# Patient Record
Sex: MALE | Race: BLACK OR AFRICAN AMERICAN | NOT HISPANIC OR LATINO | URBAN - METROPOLITAN AREA
[De-identification: names, ages, dates, MRNs, and addresses within clinical notes are randomized per-mention and may not be internally consistent; named-entity substitution may affect disease eponyms.]

---

## 2019-08-01 ENCOUNTER — OUTPATIENT (OUTPATIENT)
Dept: OUTPATIENT SERVICES | Facility: HOSPITAL | Age: 29
LOS: 1 days | End: 2019-08-01
Payer: MEDICAID

## 2019-08-01 PROCEDURE — G9001: CPT

## 2019-08-15 ENCOUNTER — EMERGENCY (EMERGENCY)
Facility: HOSPITAL | Age: 29
LOS: 1 days | Discharge: HOME | End: 2019-08-15
Attending: EMERGENCY MEDICINE | Admitting: EMERGENCY MEDICINE
Payer: MEDICAID

## 2019-08-15 VITALS
HEART RATE: 78 BPM | DIASTOLIC BLOOD PRESSURE: 95 MMHG | OXYGEN SATURATION: 98 % | SYSTOLIC BLOOD PRESSURE: 148 MMHG | RESPIRATION RATE: 18 BRPM | TEMPERATURE: 98 F

## 2019-08-15 DIAGNOSIS — F43.22 ADJUSTMENT DISORDER WITH ANXIETY: ICD-10-CM

## 2019-08-15 PROBLEM — Z00.00 ENCOUNTER FOR PREVENTIVE HEALTH EXAMINATION: Status: ACTIVE | Noted: 2019-08-15

## 2019-08-15 PROCEDURE — 99283 EMERGENCY DEPT VISIT LOW MDM: CPT

## 2019-08-15 PROCEDURE — 90792 PSYCH DIAG EVAL W/MED SRVCS: CPT

## 2019-08-15 NOTE — ED PROVIDER NOTE - PROGRESS NOTE DETAILS
Pt was cleared for discharge by psych and SW. SW discussed the case with LITA and the children are well taken care of and will be given back to the patient. ACS already contacted by LITA.

## 2019-08-15 NOTE — ED ADULT TRIAGE NOTE - CHIEF COMPLAINT QUOTE
Patient brought to ER from home by EMS after [place] states he has been threatening him. There is a case pending and the patient keeps his kids but at this time he is talking continually. Pt used to have depression and was taking Zoloft.

## 2019-08-15 NOTE — ED ADULT NURSE NOTE - OBJECTIVE STATEMENT
Received pt in  pt pleasant calm & cooperative c/o on going issues with landlord, pt denies depression no c/o si/hi/avh presently safety & comfort measures maintained eval on going.

## 2019-08-15 NOTE — ED PROVIDER NOTE - OBJECTIVE STATEMENT
29M with pmh of depression BIBA with agitation for psychiatric evaluation. Per EMS pt has been calling 311 multiple times a day to complain about his living conditions and the fact that landlord has not addressed them. Pt has custody of 2 small children. Pt also has a court investigation. Per EMS pt became very agitated at the scene, appeared paranoid and was difficult to redirect. ACS was called and children are currently in [facility] custody. Pt denies SI/HI, hallucinations, drug/alcohol use, f/c, or any other complaints.

## 2019-08-15 NOTE — ED BEHAVIORAL HEALTH ASSESSMENT NOTE - HPI (INCLUDE ILLNESS QUALITY, SEVERITY, DURATION, TIMING, CONTEXT, MODIFYING FACTORS, ASSOCIATED SIGNS AND SYMPTOMS)
30yo AAM, single, domiciled with two children in his apartment, PPH of depression and previously on zoloft (while incarcerated years ago), no prior inpatient or outpatient treatment, no prior SIB or suicide attempts, not currently in treatment, no significant PMH, history of remote cannabis abuse, legal history of incarceration for assault, attempted robbery and criminal contempt of court, brought in by EMS after patient was agitated on scene when speaking with his landlord and police told him they were taking his children to the precinct while he goes to the ED for an evaluation.     Attempted to reach the children's grandmother and aunt (as did the police) and neither answered their phones.     Per report from EMS patient was on zoloft in the past while he was in penitentiary when he was 16. He currently has custody of his two children who are 3 and 4 years old. He has been calling 311 daily to report things that are going on in the apartment (disarray, rat droppings, etc). Landlord reported patient made threats to him. Per landlord patient also lost the keys to the apartment and broke doors. Police on scene ([address]) took his children to the precinct. Patient has an upcoming court case and was recently approved to move out of his current residence to a new apartment. Police wanted patient to be evaluated as he was upset on scene.     Patient seen and examined. He is tearful and asking where his children are and if they are safe. He reports ongoing issues with things being in disarray in his apartment and his landlord not fixing them. He states he stopped speaking to the landlord (who he reports told him will call his  if he keeps calling him) and instead calls 311 daily to file complaints with the building department. "they told me I can call every day and file a complaint so that I can make a case". He reports he did not break doors in his apartment, but was told by the building department to take them off the hinges as they had nails sticking out from them and they were dangerous for his children, which he did. He also reports rat droppings in his apartment and other concerns. He has a court date to address these issues on 8/19/19. He also reports he has been approved to move to a new place due to his complaints to 311 and his living conditions. Patient reports that he and his landlord have had chronic issues since he has been complaining and especially since he has been calling 311. Patient is also concerned now that he is here that [place] may go into his apartment and take all the papers/records he has for the court case next week. He reports that today the landlord called EMS and when police came he was being questioned by 4 different police officers rapidly. He started to become stressed and asked them to stop and slow down. He then became agitated when they told him they were going to take his children to the precinct as he was concerned for their safety and did not want them to be scared. He has been calm and cooperative during his entire ED stay. Patient reports that he was on zoloft when he was 16 and in penitentiary. He was on it for a few months and was not continued when he left penitentiary. He denies SI/HI/I/P. Patient denies any depressive symptoms including depressed mood, anhedonia, changes in energy/concentration/appetite, sleep disturbances, or feelings of guilt. Patient denies manic symptoms including elevated mood, increased irritability, mood lability, distractibility, grandiosity, pressured speech, increase in goal-directed activity, or decreased need for sleep. Patient denies any psychotic symptoms including paranoia, ideas of reference, thought insertion/broadcasting, or auditory/visual/olfactory/tactile/gustatory hallucinations. Patient reports he used cannabis in the remote past and denies any recent use. 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    Per police at [employer] (contacted by social work), his two children are in good health and appear well taken care of. They are amenable to discharge children back to him since he has been psychiatrically cleared. See  note for full collateral.    PSYCKES showed no results for patient for the past 5 years for anything psychiatric.

## 2019-08-15 NOTE — ED BEHAVIORAL HEALTH ASSESSMENT NOTE - LEGAL HISTORY
2006 - incarcerated for assault and attempted robbery, 2012 incarcerated for misdemeanor for criminal contempt of court

## 2019-08-15 NOTE — ED BEHAVIORAL HEALTH ASSESSMENT NOTE - SAFETY PLAN DETAILS
patient advised to return to ED or call 911 for any worsening symptoms and patient agreed. Call [phone] if you need to speak with someone 24/7

## 2019-08-15 NOTE — ED BEHAVIORAL HEALTH ASSESSMENT NOTE - SUMMARY
30yo AAM, single, domiciled with two children in his apartment, PPH of depression and previously on zoloft (while incarcerated years ago), no prior inpatient or outpatient treatment, no prior SIB or suicide attempts, not currently in treatment, no significant PMH, history of remote cannabis abuse, legal history of incarceration for assault, attempted robbery and criminal contempt of court, brought in by EMS after patient was agitated on scene when speaking with his landlord and police told him they were taking his children to the precinct while he goes to the ED for an evaluation.     Patient denies SI/HI/I/P as well as ruthie and psychosis. Reports he has a long standing issue with his landlord due to his apartment in disarray and landlord allegedly not fixing things, to the point where he was recently approved to move to a new place. He also has a court case next week to address these complaints. Patient admits to being upset on scene as he was told his children were being taken to the police station and he did not understand why, stating any concerned parent would also be upset in the same situation. Patient has been calm and cooperative in the ED. He does not appear to be an imminent risk to self or others at this time and does not meet criteria for inpatient admission. Per police reports to , his children are clean and in good health. Patient will be discharged at this time and given information to crisis clinic if he feels the need to go in the future for any reason.

## 2019-08-15 NOTE — ED BEHAVIORAL HEALTH ASSESSMENT NOTE - RISK ASSESSMENT
low. risks include remote history of depression, history of incarceration. Protective factors include no suicide attempts, no recent violence history, no access to guns, no global insomnia, no active substance abuse, caretaker for children who he reports are protective factors, willingness to seek help, no suicidal ideation or homicidal ideation, hopefulness for future.

## 2019-08-15 NOTE — ED BEHAVIORAL HEALTH ASSESSMENT NOTE - DETAILS
police aware called by [employer] today currently at the [employer] [place] (verified by social work that children are there) prior history of arrest for assault when he was 16

## 2019-08-15 NOTE — ED BEHAVIORAL HEALTH NOTE - BEHAVIORAL HEALTH NOTE
writer provided patient metrocard #[id number] writer provided patient metrocard #[id number]

## 2019-08-15 NOTE — ED PROVIDER NOTE - CLINICAL SUMMARY MEDICAL DECISION MAKING FREE TEXT BOX
29M with pmh of depression p/w  agitation. Likely 2/2 to stress, possibly with underlying psychiatric disturbance, less likely substance abuse. Also with ACS case now open. Plan for  eval and  eval. Do not suspect infection, electrolyte abnormality.

## 2019-08-15 NOTE — ED PROVIDER NOTE - NSFOLLOWUPINSTRUCTIONS_ED_ALL_ED_FT
Return to the ED if you feel like you want to harm yourself or others, hearing voices, having fever, chest pain, severe headache or any other concerning symptom.

## 2019-08-15 NOTE — ED BEHAVIORAL HEALTH ASSESSMENT NOTE - OTHER PAST PSYCHIATRIC HISTORY (INCLUDE DETAILS REGARDING ONSET, COURSE OF ILLNESS, INPATIENT/OUTPATIENT TREATMENT)
PPH of depression and previously on zoloft (while incarcerated years ago), no prior inpatient or outpatient treatment, no prior SIB or suicide attempts, not currently in treatment

## 2019-08-15 NOTE — ED BEHAVIORAL HEALTH ASSESSMENT NOTE - SUICIDE PROTECTIVE FACTORS
Responsibility to family and others/Identifies reasons for living/Future oriented/Fear of death or dying due to pain/suffering/High spirituality

## 2019-08-15 NOTE — ED ADULT NURSE REASSESSMENT NOTE - NS ED NURSE REASSESS COMMENT FT1
Evaluated and cleared by psychiatry, MC by MD.  Pt remains awake, calm at baseline mental status. Denies s/i h/avh  d/c instructions given and metro card provided for d/c to home by JACE. English

## 2019-08-15 NOTE — ED BEHAVIORAL HEALTH ASSESSMENT NOTE - DESCRIPTION
calm and cooperative. anxious and tearful, concerned about where his children are and if they are alright.  ICU Vital Signs Last 24 Hrs  T(C): 36.7 (15 Aug 2019 09:54), Max: 36.7 (15 Aug 2019 09:54)  T(F): 98 (15 Aug 2019 09:54), Max: 98 (15 Aug 2019 09:54)  HR: 78 (15 Aug 2019 09:54) (78 - 78)  BP: 148/95 (15 Aug 2019 09:54) (148/95 - 148/95)  BP(mean): --  ABP: --  ABP(mean): --  RR: 18 (15 Aug 2019 09:54) (18 - 18)  SpO2: 98% (15 Aug 2019 09:54) (98% - 98%) denied patient was adopted as a child. lives with two children

## 2019-08-15 NOTE — ED BEHAVIORAL HEALTH ASSESSMENT NOTE - OTHER
tearful and concerned for his children domiciled with 3 and 4 year old children issues with [place] (chronic)

## 2019-08-15 NOTE — ED BEHAVIORAL HEALTH NOTE - BEHAVIORAL HEALTH NOTE
Writer called  to obtain collateral.  Writer left a voicemail requesting a callback to [employer]. Writer called  to obtain collateral.  Writer left a voicemail requesting a callback to social work [employer]. Writer called [employer]  spoke to officer kimberley states pt's children are at the precinct, their grandmother has been called and ACS was called.  Writer informed them patient was psychiatrically cleared and being discharged.  Writer asked if patient can come to get his children officer Zachary states patient cannot get his children because he's an EDP.  Writer spoke with officer that brought patient to the ER Augie who states he will check with his boss if patient can tranport his own children home.  writer informed officer Augie patient was cleared by a psychiatrist. He states he will call back to [employer]. He states they are unable to get in contact with pt's mother or aunt to  the children.

## 2019-08-15 NOTE — ED PROVIDER NOTE - NSFOLLOWUPCLINICS_GEN_ALL_ED_FT
[facility]  Behavioral Health  [id number] [place] [address]  Phone: [phone]  Fax:   Follow Up Time: 4-6 Days

## 2019-08-16 DIAGNOSIS — Z71.89 OTHER SPECIFIED COUNSELING: ICD-10-CM

## 2019-09-24 ENCOUNTER — OUTPATIENT (OUTPATIENT)
Dept: OUTPATIENT SERVICES | Facility: HOSPITAL | Age: 29
LOS: 1 days | Discharge: STILL ADMITTED OR ADMINISTRATIVE | End: 2019-09-24

## 2019-09-25 PROBLEM — F32.9 MAJOR DEPRESSIVE DISORDER, SINGLE EPISODE, UNSPECIFIED: Chronic | Status: ACTIVE | Noted: 2019-08-15

## 2019-10-10 DIAGNOSIS — F43.20 ADJUSTMENT DISORDER, UNSPECIFIED: ICD-10-CM

## 2021-05-23 ENCOUNTER — INPATIENT (INPATIENT)
Facility: HOSPITAL | Age: 31
LOS: 39 days | Discharge: HOME | End: 2021-07-02
Attending: PSYCHIATRY & NEUROLOGY | Admitting: PSYCHIATRY & NEUROLOGY
Payer: MEDICAID

## 2021-05-23 VITALS
HEART RATE: 99 BPM | RESPIRATION RATE: 20 BRPM | OXYGEN SATURATION: 100 % | DIASTOLIC BLOOD PRESSURE: 109 MMHG | TEMPERATURE: 99 F | SYSTOLIC BLOOD PRESSURE: 177 MMHG

## 2021-05-23 RX ORDER — HALOPERIDOL DECANOATE 100 MG/ML
5 INJECTION INTRAMUSCULAR ONCE
Refills: 0 | Status: COMPLETED | OUTPATIENT
Start: 2021-05-23 | End: 2021-05-23

## 2021-05-23 NOTE — ED PROVIDER NOTE - PROGRESS NOTE DETAILS
Pascual Marks DO: received from dr ng. pending inpt psych bed. no acute events during my shift. patient signed out to dr foster. Pascual Marks DO: received from JOYCE Gray, pending inpt bed. pt s/o to Dr Palacios at the end of my shift rec s/o from dr. fernandez-patient boarding for psych bed previously agitated now calm, d/w psych tab to l6. patient becoming very agitated while waiting for a bed, verbal deescalation techniques unsuccessful, [facility] 5haldol, 2ativan, 50benadryl  for agitation.

## 2021-05-23 NOTE — ED PROVIDER NOTE - OBJECTIVE STATEMENT
29 y/o M BIBA  secondary to trashing his room , yelling, screaming and   jumping  as per his landlord.     Appears paranoid, expressing a flight of ideas. No responding to directions.  No evidence of physical injuries, broken  skin or deformities.

## 2021-05-23 NOTE — ED ADULT NURSE NOTE - CHIEF COMPLAINT QUOTE
Pt arrives from home for erratic behavior. Pt's neighbor called 911 d/t pt making a lot of noise. Pt was destroying stuff at home, states he did it "because people are watching him". Pt rambling in triage, yelling his social security number out. Denies ETOH or drug use. To be seen in  as per NP. PMHx Depression

## 2021-05-23 NOTE — ED ADULT NURSE NOTE - OBJECTIVE STATEMENT
Pt presents to  area under arrest with PD [employer] [employer], coming in because pt was destroying home and landlord called PD. Pt behavior is erratic, pt is hyperverbal with nonsensical rambling. Pt states to PD, "you guys just wanna mess with me, you saw my penis and wouldn't wash my feet." Pt denies any specific triggers causing him to destroy home. Denies any AH/VH, denies any HI/SI. Pt medicated as per EMR. Denies any medical complaints, resp even and unlabored and appears in no obv distress.

## 2021-05-24 DIAGNOSIS — F29 UNSPECIFIED PSYCHOSIS NOT DUE TO A SUBSTANCE OR KNOWN PHYSIOLOGICAL CONDITION: ICD-10-CM

## 2021-05-24 DIAGNOSIS — F33.9 MAJOR DEPRESSIVE DISORDER, RECURRENT, UNSPECIFIED: ICD-10-CM

## 2021-05-24 LAB
ALBUMIN SERPL ELPH-MCNC: 4.7 G/DL — SIGNIFICANT CHANGE UP (ref 3.3–5)
ALP SERPL-CCNC: 57 U/L — SIGNIFICANT CHANGE UP (ref 40–120)
ALT FLD-CCNC: 25 U/L — SIGNIFICANT CHANGE UP (ref 4–41)
ANION GAP SERPL CALC-SCNC: 14 MMOL/L — SIGNIFICANT CHANGE UP (ref 7–14)
AST SERPL-CCNC: 45 U/L — HIGH (ref 4–40)
BASOPHILS # BLD AUTO: 0.03 K/UL — SIGNIFICANT CHANGE UP (ref 0–0.2)
BASOPHILS NFR BLD AUTO: 0.4 % — SIGNIFICANT CHANGE UP (ref 0–2)
BILIRUB SERPL-MCNC: 1 MG/DL — SIGNIFICANT CHANGE UP (ref 0.2–1.2)
BUN SERPL-MCNC: 14 MG/DL — SIGNIFICANT CHANGE UP (ref 7–23)
CALCIUM SERPL-MCNC: 9.8 MG/DL — SIGNIFICANT CHANGE UP (ref 8.4–10.5)
CHLORIDE SERPL-SCNC: 103 MMOL/L — SIGNIFICANT CHANGE UP (ref 98–107)
CO2 SERPL-SCNC: 22 MMOL/L — SIGNIFICANT CHANGE UP (ref 22–31)
COVID-19 SPIKE DOMAIN AB INTERP: NEGATIVE — SIGNIFICANT CHANGE UP
COVID-19 SPIKE DOMAIN ANTIBODY RESULT: 0.4 U/ML — SIGNIFICANT CHANGE UP
CREAT SERPL-MCNC: 1.04 MG/DL — SIGNIFICANT CHANGE UP (ref 0.5–1.3)
EOSINOPHIL # BLD AUTO: 0.06 K/UL — SIGNIFICANT CHANGE UP (ref 0–0.5)
EOSINOPHIL NFR BLD AUTO: 0.9 % — SIGNIFICANT CHANGE UP (ref 0–6)
GLUCOSE SERPL-MCNC: 107 MG/DL — HIGH (ref 70–99)
HCT VFR BLD CALC: 41.2 % — SIGNIFICANT CHANGE UP (ref 39–50)
HGB BLD-MCNC: 14 G/DL — SIGNIFICANT CHANGE UP (ref 13–17)
IANC: 4.86 K/UL — SIGNIFICANT CHANGE UP (ref 1.5–8.5)
IMM GRANULOCYTES NFR BLD AUTO: 0.3 % — SIGNIFICANT CHANGE UP (ref 0–1.5)
LYMPHOCYTES # BLD AUTO: 1.4 K/UL — SIGNIFICANT CHANGE UP (ref 1–3.3)
LYMPHOCYTES # BLD AUTO: 20.9 % — SIGNIFICANT CHANGE UP (ref 13–44)
MCHC RBC-ENTMCNC: 30 PG — SIGNIFICANT CHANGE UP (ref 27–34)
MCHC RBC-ENTMCNC: 34 GM/DL — SIGNIFICANT CHANGE UP (ref 32–36)
MCV RBC AUTO: 88.4 FL — SIGNIFICANT CHANGE UP (ref 80–100)
MONOCYTES # BLD AUTO: 0.33 K/UL — SIGNIFICANT CHANGE UP (ref 0–0.9)
MONOCYTES NFR BLD AUTO: 4.9 % — SIGNIFICANT CHANGE UP (ref 2–14)
NEUTROPHILS # BLD AUTO: 4.86 K/UL — SIGNIFICANT CHANGE UP (ref 1.8–7.4)
NEUTROPHILS NFR BLD AUTO: 72.6 % — SIGNIFICANT CHANGE UP (ref 43–77)
NRBC # BLD: 0 /100 WBCS — SIGNIFICANT CHANGE UP
NRBC # FLD: 0 K/UL — SIGNIFICANT CHANGE UP
PLATELET # BLD AUTO: 182 K/UL — SIGNIFICANT CHANGE UP (ref 150–400)
POTASSIUM SERPL-MCNC: 3.4 MMOL/L — LOW (ref 3.5–5.3)
POTASSIUM SERPL-SCNC: 3.4 MMOL/L — LOW (ref 3.5–5.3)
PROT SERPL-MCNC: 7.6 G/DL — SIGNIFICANT CHANGE UP (ref 6–8.3)
RBC # BLD: 4.66 M/UL — SIGNIFICANT CHANGE UP (ref 4.2–5.8)
RBC # FLD: 13.3 % — SIGNIFICANT CHANGE UP (ref 10.3–14.5)
SARS-COV-2 IGG+IGM SERPL QL IA: 0.4 U/ML — SIGNIFICANT CHANGE UP
SARS-COV-2 IGG+IGM SERPL QL IA: NEGATIVE — SIGNIFICANT CHANGE UP
SARS-COV-2 RNA SPEC QL NAA+PROBE: SIGNIFICANT CHANGE UP
SODIUM SERPL-SCNC: 139 MMOL/L — SIGNIFICANT CHANGE UP (ref 135–145)
TOXICOLOGY SCREEN, DRUGS OF ABUSE, SERUM RESULT: SIGNIFICANT CHANGE UP
TSH SERPL-MCNC: 1.75 UIU/ML — SIGNIFICANT CHANGE UP (ref 0.27–4.2)
WBC # BLD: 6.7 K/UL — SIGNIFICANT CHANGE UP (ref 3.8–10.5)
WBC # FLD AUTO: 6.7 K/UL — SIGNIFICANT CHANGE UP (ref 3.8–10.5)

## 2021-05-24 PROCEDURE — 99285 EMERGENCY DEPT VISIT HI MDM: CPT

## 2021-05-24 RX ORDER — DIPHENHYDRAMINE HCL 50 MG
50 CAPSULE ORAL ONCE
Refills: 0 | Status: COMPLETED | OUTPATIENT
Start: 2021-05-24 | End: 2021-05-24

## 2021-05-24 RX ORDER — HALOPERIDOL DECANOATE 100 MG/ML
5 INJECTION INTRAMUSCULAR EVERY 6 HOURS
Refills: 0 | Status: DISCONTINUED | OUTPATIENT
Start: 2021-05-24 | End: 2021-07-02

## 2021-05-24 RX ORDER — DIPHENHYDRAMINE HCL 50 MG
50 CAPSULE ORAL ONCE
Refills: 0 | Status: DISCONTINUED | OUTPATIENT
Start: 2021-05-24 | End: 2021-07-02

## 2021-05-24 RX ORDER — DIPHENHYDRAMINE HCL 50 MG
50 CAPSULE ORAL EVERY 6 HOURS
Refills: 0 | Status: DISCONTINUED | OUTPATIENT
Start: 2021-05-24 | End: 2021-07-02

## 2021-05-24 RX ORDER — HALOPERIDOL DECANOATE 100 MG/ML
5 INJECTION INTRAMUSCULAR ONCE
Refills: 0 | Status: DISCONTINUED | OUTPATIENT
Start: 2021-05-24 | End: 2021-07-02

## 2021-05-24 RX ORDER — HALOPERIDOL DECANOATE 100 MG/ML
5 INJECTION INTRAMUSCULAR ONCE
Refills: 0 | Status: COMPLETED | OUTPATIENT
Start: 2021-05-24 | End: 2021-05-24

## 2021-05-24 RX ADMIN — HALOPERIDOL DECANOATE 5 MILLIGRAM(S): 100 INJECTION INTRAMUSCULAR at 00:00

## 2021-05-24 RX ADMIN — Medication 2 MILLIGRAM(S): at 00:00

## 2021-05-24 RX ADMIN — HALOPERIDOL DECANOATE 5 MILLIGRAM(S): 100 INJECTION INTRAMUSCULAR at 12:27

## 2021-05-24 RX ADMIN — Medication 2 MILLIGRAM(S): at 12:28

## 2021-05-24 RX ADMIN — Medication 50 MILLIGRAM(S): at 12:27

## 2021-05-24 NOTE — ED ADULT NURSE REASSESSMENT NOTE - NS ED NURSE REASSESS COMMENT FT1
Pt in room 1BH.  Labs drawn and sent.  Covid swabbed.  EKG performed by PCA.  Meal provided.  Psych speaking with pt.  Pt calm and cooperative.  Primary RN reports pt medicated prior to shift hand off.

## 2021-05-24 NOTE — ED BEHAVIORAL HEALTH ASSESSMENT NOTE - OTHER PAST PSYCHIATRIC HISTORY (INCLUDE DETAILS REGARDING ONSET, COURSE OF ILLNESS, INPATIENT/OUTPATIENT TREATMENT)
Per medical record review (2019 ED assessment) PPH of depression and previously on zoloft (while incarcerated years ago), no prior inpatient or outpatient treatment, no prior SIB or suicide attempts, not currently in treatment

## 2021-05-24 NOTE — ED BEHAVIORAL HEALTH ASSESSMENT NOTE - CASE SUMMARY
Patient was seen , evaluated , discussed elements of hpi/ ros/mse with DR. Murphy and agree with the above assessment and plan. 30 yo single, domiciled, unemployed male with reported PPH of depression previously on Zoloft (while incarcerated years ago), no prior inpatient or outpatient treatment, no prior SIB or suicide attempts, not currently in treatment, no significant PMH, history of remote cannabis abuse, legal history of incarceration for assault, attempted robbery and criminal contempt of court, brought in by EMS for agitation in the home.  On assessment pt is presenting guarded , evasive , with impaired reasoning ,internally preoccupied , not able to engage in meaningful assessment  , as he provides minimal information. Safe discharge at this time is ot possible and pt requires involuntary admission once bed is available.

## 2021-05-24 NOTE — ED BEHAVIORAL HEALTH ASSESSMENT NOTE - HPI (INCLUDE ILLNESS QUALITY, SEVERITY, DURATION, TIMING, CONTEXT, MODIFYING FACTORS, ASSOCIATED SIGNS AND SYMPTOMS)
29 yo single, domiciled, unemployed male with reported PPH of depression previously on Zoloft (while incarcerated years ago), no prior inpatient or outpatient treatment, no prior SIB or suicide attempts, not currently in treatment, no significant PMH, history of remote cannabis abuse, legal history of incarceration for assault, attempted robbery and criminal contempt of court, brought in by EMS for agitation in the home.    Prior to assessment patient received Haldol 5 mg and Ativan 2 mg IM. On assessment, patient is guarded with pressured speech. He states he is having a "landlord dispute". He responds "N/A" or "that's not your business" when asked for details about why or if there's been acute changes in the home. He states this has been a years-long dispute. He also states being bothered by the tenants and other people in his community. He states asking people in the community if they live there and who their family is. He murmurs to himself "I'll slit their throats". He states he has 2 children (~ ages 4, 5 per medical record review) but that they do not live in the home with him. He replies "N/A" when asked if there are safety concerns for them. When asked if he feels the need to protect himself he asks the writer "you want to do push-ups?" stating that the thought appeared in writer's head. He denies AH and VH.      Patient reports getting 9-9.5 hrs of sleep and that he is eating fine. He denies having special zimmerman, need to take on multiple projects (responds he needs his apartment fixed). He denies SI/HI/NSSIB (states "N/A"). Denies depressive symptoms (denies reported history on 2019 ED assessment). When asked about firearms or weapons he states "N/A". He states "N/A" when asked about the need to protect himself. He endorses past legal history and responds "yes" about pending court cases but does not provide further information. He denies substance use. 31 yo single, domiciled, unemployed male with reported PPH of depression previously on Zoloft (while incarcerated years ago), no prior inpatient or outpatient treatment, no prior SIB or suicide attempts, not currently in treatment, no significant PMH, history of remote cannabis abuse, legal history of incarceration for assault, attempted robbery and criminal contempt of court, brought in by EMS for agitation in the home.    Prior to assessment patient received Haldol 5 mg and Ativan 2 mg IM. On assessment, patient is guarded with pressured speech. He states he is having a "landlord dispute". He responds "N/A" or "that's not your business" when asked for details about why or if there's been acute changes in the home. He states this has been a years-long dispute. He also states being bothered by the tenants and other people in his community. He states asking people in the community if they live there and who their family is. He murmurs to himself "I'll slit their throats". He states he has 2 children (~ ages 4, 5 per medical record review) but that they do not live in the home with him. He replies "N/A" when asked if there are safety concerns for them. He asks "you want to do push-ups?" stating that the thought appeared in writer's head. He denies AH and VH.      Patient reports getting 9-9.5 hrs of sleep and that he is eating fine. He denies having special zimmerman, need to take on multiple projects (responds he needs his apartment fixed). He denies SI/HI/NSSIB (states "N/A"). Denies depressive symptoms (denies reported history on 2019 ED assessment). When asked about firearms or weapons he states "N/A". He states "N/A" when asked about the need to protect himself. He endorses past legal history and responds "yes" about pending court cases but does not provide further information. He denies substance use. 30 yo single, domiciled, unemployed male with reported PPH of depression previously on Zoloft (while incarcerated years ago), no prior inpatient or outpatient treatment, no prior SIB or suicide attempts, not currently in treatment, no significant PMH, history of remote cannabis abuse, legal history of incarceration for assault, attempted robbery and criminal contempt of court, brought in by EMS for agitation in the home.    Prior to assessment patient received Haldol 5 mg and Ativan 2 mg IM. On assessment, patient is guarded with pressured speech. He states he is having a "landlord dispute". He responds "N/A" or "that's not your business" when asked for details about why or if there's been acute changes in the home. He states this has been a years-long dispute. He also states being bothered by the tenants and other people in his community. He states asking people in the community if they live there and who their family is. He murmurs to himself "I'll slit their throats". He states he has 2 children (~ ages 5, 6 per medical record review) but that they do not live in the home with him. He replies "N/A" when asked if there are safety concerns for them. He asks "you want to do push-ups?" stating that the thought appeared in writer's head. He denies AH and VH.      Patient reports getting 9-9.5 hrs of sleep and that he is eating fine. He denies having special zimmerman, need to take on multiple projects (responds he needs his apartment fixed). He denies SI/HI/NSSIB (states "N/A"). Denies depressive symptoms (denies reported history on 2019 ED assessment). When asked about firearms or weapons he states "N/A". He states "N/A" when asked about the need to protect himself. He endorses past legal history and responds "yes" about pending court cases but does not provide further information. He denies substance use.

## 2021-05-24 NOTE — ED BEHAVIORAL HEALTH ASSESSMENT NOTE - SUMMARY
31 yo single, domiciled, unemployed male with reported PPH of depression previously on Zoloft (while incarcerated years ago), no prior inpatient or outpatient treatment, no prior SIB or suicide attempts, not currently in treatment, no significant PMH, history of remote cannabis abuse, legal history of incarceration for assault, attempted robbery and criminal contempt of court, brought in by EMS for agitation in the home.    Patient at this time is displaying mixed symptoms of psychosis and ruthie: He appears internally preoccupied, describes being able to hear writer's thoughts, and displays overt paranoia with some referential thinking. His speech is rapid and difficult to interrupt. He displays impaired reasoning. He replies "N/A" to questions around SI/HI but with threatening speech associated with paranoid beliefs. Per medical record review (2019 ED assessment), patient with hx of depression and confirmed dispute with [place]. It is unclear what the progression of these disputes have been or if there has been any psychiatric/social follow-up. Involuntary hospitalization is warranted for acute stabilization of presenting symptoms. 31 yo single, domiciled, unemployed male with reported PPH of depression previously on Zoloft (while incarcerated years ago), no prior inpatient or outpatient treatment, no prior SIB or suicide attempts, not currently in treatment, no significant PMH, history of remote cannabis abuse, legal history of incarceration for assault, attempted robbery and criminal contempt of court, brought in by EMS for agitation in the home.    Patient at this time is displaying mixed symptoms of psychosis and ruthie: He appears internally preoccupied, describes being able to hear writer's thoughts, and displays overt paranoia with some referential thinking. His speech is rapid and difficult to interrupt. He displays impaired reasoning. He replies "N/A" to questions around SI/HI but with threatening speech associated with paranoid beliefs. Per medical record review (2019 ED assessment), patient with hx of depression and confirmed dispute with [place]. It is unclear what the progression of these disputes have been or if there has been any psychiatric/social follow-up. Overall, involuntary hospitalization is warranted for acute stabilization of presenting symptoms. 32 yo single, domiciled, unemployed male with reported PPH of depression previously on Zoloft (while incarcerated years ago), no prior inpatient or outpatient treatment, no prior SIB or suicide attempts, not currently in treatment, no significant PMH, history of remote cannabis abuse, legal history of incarceration for assault, attempted robbery and criminal contempt of court, brought in by EMS for agitation in the home.    Patient at this time is displaying mixed symptoms of psychosis and ruthie: He appears internally preoccupied, describes being able to hear writer's thoughts, and displays overt paranoia with some referential thinking. His speech is rapid and difficult to interrupt. He displays impaired reasoning. He replies "N/A" to questions around SI/HI but with threatening speech associated with paranoid beliefs. Per medical record review (2019 ED assessment), patient with hx of depression and confirmed dispute with [place]. It is unclear what the progression of these disputes have been or if there has been any psychiatric/social follow-up. Overall, involuntary hospitalization is warranted for acute stabilization of presenting symptoms. 30 yo single, domiciled, unemployed male with reported PPH of depression previously on Zoloft (while incarcerated years ago), no prior inpatient or outpatient treatment, no prior SIB or suicide attempts, not currently in treatment, no significant PMH, history of remote cannabis abuse, legal history of incarceration for assault, attempted robbery and criminal contempt of court, brought in by EMS for agitation in the home.    Patient at this time is displaying mixed symptoms of psychosis and ruthie: He appears internally preoccupied, describes being able to hear writer's thoughts, and displays overt paranoia with some referential thinking. His speech is rapid and difficult to interrupt. He displays impaired reasoning and unpredictability. He replies "N/A" to questions around SI/HI but with threatening speech associated with paranoid beliefs. Per medical record review (2019 ED assessment), patient with hx of depression and confirmed dispute with [place]. It is unclear what the progression of these disputes have been or if there has been any psychiatric/social follow-up. Overall, involuntary hospitalization is warranted for acute stabilization of presenting symptoms.

## 2021-05-24 NOTE — ED ADULT NURSE REASSESSMENT NOTE - NS ED NURSE REASSESS COMMENT FT1
pt given IM at this time for previous agitation and was told by psych that he is being admitted to [place] [facility] [address].
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
## 2021-05-24 NOTE — ED BEHAVIORAL HEALTH ASSESSMENT NOTE - OTHER
some loosening intense "happy" some linearity but with impaired reasoning superficially cooperative, guarded boarding

## 2021-05-24 NOTE — ED BEHAVIORAL HEALTH NOTE - BEHAVIORAL HEALTH NOTE
COVID Exposure Screen- Patient  1.	*Have you had a COVID-19 test in the last 90 days?  (  ) Yes   ( x ) No   (  ) Unknown- Reason: _____  IF YES PROCEED TO QUESTION #2. IF NO OR UNKNOWN, PLEASE SKIP TO QUESTION #3.  2.	Date of test(s) and result(s): ________  3.	*Have you tested positive for COVID-19 antibodies? (  ) Yes   (x ) No   (  ) Unknown- Reason: _____  IF YES PROCEED TO QUESTION #4. IF NO or UNKNOWN, PLEASE SKIP TO QUESTION #5.  4.	Date of positive antibody test: ________  5.	*Have you received 2 doses of the COVID-19 vaccine? (  ) Yes   (x  ) No   (  ) Unknown- Reason: _____   IF YES PROCEED TO QUESTION #6. IF NO or UNKNOWN, PLEASE SKIP TO QUESTION #7.  6.	Date of second dose: ________  7.	*In the past 10 days, have you been around anyone with a positive COVID-19 test?* (  ) Yes   ( x ) No   (  ) Unknown- Reason: ____  IF YES PROCEED TO QUESTION #8. IF NO or UNKNOWN, PLEASE SKIP TO QUESTION #13.  8.	Were you within 6 feet of them for at least 15 minutes? (  ) Yes   (  ) No   (  ) Unknown- Reason: _____  9.	Have you provided care for them? (  ) Yes   (  ) No   (  ) Unknown- Reason: ______  10.	Have you had direct physical contact with them (touched, hugged, or kissed them)? (  ) Yes   (  ) No    (  ) Unknown- Reason: _____  11.	Have you shared eating or drinking utensils with them? (  ) Yes   (  ) No    (  ) Unknown- Reason: ____  12.	Have they sneezed, coughed, or somehow gotten respiratory droplets on you? (  ) Yes   (  ) No    (  ) Unknown- Reason: ______  13.	*Have you been out of [place] within the past 10 days?* (  ) Yes   ( x ) No   (  ) Unknown- Reason: _____  IF YES PLEASE ANSWER THE FOLLOWING QUESTIONS:  14.	Which state/country have you been to? ______  15.	Were you there over 24 hours? (  ) Yes   (  ) No    (  ) Unknown- Reason: ______  16.	Date of return to [facility]: ______

## 2021-05-24 NOTE — ED BEHAVIORAL HEALTH ASSESSMENT NOTE - DESCRIPTION
Per record review: patient was adopted as a child. Has two children. Unemployed (states has financial support but did not disclose further). denied Vital Signs Last 24 Hrs  T(C): 37.1 (23 May 2021 23:27), Max: 37.1 (23 May 2021 23:27)  T(F): 98.7 (23 May 2021 23:27), Max: 98.7 (23 May 2021 23:27)  HR: 99 (23 May 2021 23:27) (99 - 99)  BP: 177/109 (23 May 2021 23:27) (177/109 - 177/109)  BP(mean): --  RR: 20 (23 May 2021 23:27) (20 - 20)  SpO2: 100% (23 May 2021 23:27) (100% - 100%)

## 2021-05-24 NOTE — ED ADULT NURSE REASSESSMENT NOTE - NS ED NURSE REASSESS COMMENT FT1
received pt from previous shift. pt is resting well in  bedroom. no issues noted at this time. will follow up.

## 2021-05-24 NOTE — ED BEHAVIORAL HEALTH ASSESSMENT NOTE - REASON FOR REFERRAL
COVID Screen    Left generic message on Mom's unidentified voicemail to call the pediatric clinic.   Upset at land/[email or web address]

## 2021-05-24 NOTE — ED ADULT NURSE REASSESSMENT NOTE - NS ED NURSE REASSESS COMMENT FT1
left -ed with ems for transfer to [facility] low 6. no issues noted. pt went calmly.

## 2021-05-24 NOTE — ED BEHAVIORAL HEALTH ASSESSMENT NOTE - RISK ASSESSMENT
Low Acute Suicide Risk Moderate-high risk of harm to self or others in current state.  Chronic risks: remote history of depression, history of incarceration.   Acute risks: psychosis, not in treatment, property damage.   Protective factors include no suicide attempts, responsibility to children.

## 2021-05-25 LAB
A1C WITH ESTIMATED AVERAGE GLUCOSE RESULT: 5.3 % — SIGNIFICANT CHANGE UP (ref 4–5.6)
CHOLEST SERPL-MCNC: 162 MG/DL — SIGNIFICANT CHANGE UP
ESTIMATED AVERAGE GLUCOSE: 105 MG/DL — SIGNIFICANT CHANGE UP (ref 68–114)
HDLC SERPL-MCNC: 70 MG/DL — SIGNIFICANT CHANGE UP
LIPID PNL WITH DIRECT LDL SERPL: 81 MG/DL — SIGNIFICANT CHANGE UP
NON HDL CHOLESTEROL: 92 MG/DL — SIGNIFICANT CHANGE UP
TRIGL SERPL-MCNC: 57 MG/DL — SIGNIFICANT CHANGE UP

## 2021-05-25 PROCEDURE — 99222 1ST HOSP IP/OBS MODERATE 55: CPT

## 2021-05-25 RX ORDER — ARIPIPRAZOLE 15 MG/1
10 TABLET ORAL AT BEDTIME
Refills: 0 | Status: DISCONTINUED | OUTPATIENT
Start: 2021-05-25 | End: 2021-06-11

## 2021-05-25 RX ORDER — TRAZODONE HCL 50 MG
50 TABLET ORAL AT BEDTIME
Refills: 0 | Status: DISCONTINUED | OUTPATIENT
Start: 2021-05-25 | End: 2021-07-02

## 2021-05-25 NOTE — BH INPATIENT PSYCHIATRY ASSESSMENT NOTE - HPI (INCLUDE ILLNESS QUALITY, SEVERITY, DURATION, TIMING, CONTEXT, MODIFYING FACTORS, ASSOCIATED SIGNS AND SYMPTOMS)
30 yo single, domiciled, unemployed male with reported PPH of depression previously on Zoloft (while incarcerated years ago), no prior inpatient or outpatient treatment, no prior SIB or suicide attempts, not currently in treatment, no significant PMH, history of remote cannabis abuse, legal history of incarceration for assault, attempted robbery and criminal contempt of court, brought in by EMS for agitation in the home.    Prior to assessment patient received Haldol 5 mg and Ativan 2 mg IM. On assessment, patient is guarded with pressured speech. He states he is having a "landlord dispute". He responds "N/A" or "that's not your business" when asked for details about why or if there's been acute changes in the home. He states this has been a years-long dispute. He also states being bothered by the tenants and other people in his community. He states asking people in the community if they live there and who their family is. He murmurs to himself "I'll slit their throats". He states he has 2 children (~ ages 5, 6 per medical record review) but that they do not live in the home with him. He replies "N/A" when asked if there are safety concerns for them. He asks "you want to do push-ups?" stating that the thought appeared in writer's head. He denies AH and VH.      Patient reports getting 9-9.5 hrs of sleep and that he is eating fine. He denies having special zimmerman, need to take on multiple projects (responds he needs his apartment fixed). He denies SI/HI/NSSIB (states "N/A"). Denies depressive symptoms (denies reported history on 2019 ED assessment). When asked about firearms or weapons he states "N/A". He states "N/A" when asked about the need to protect himself. He endorses past legal history and responds "yes" about pending court cases but does not provide further information. He denies substance use.
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
## 2021-05-25 NOTE — BH INPATIENT PSYCHIATRY ASSESSMENT NOTE - RISK ASSESSMENT
Moderate-high risk of harm to self or others in current state.  Chronic risks: remote history of depression, history of incarceration.   Acute risks: psychosis, not in treatment, property damage.   Protective factors include no suicide attempts, responsibility to children.

## 2021-05-25 NOTE — BH INPATIENT PSYCHIATRY ASSESSMENT NOTE - CURRENT MEDICATION
MEDICATIONS  (STANDING):  ARIPiprazole 10 milliGRAM(s) Oral at bedtime    MEDICATIONS  (PRN):  diphenhydrAMINE 50 milliGRAM(s) Oral every 6 hours PRN eps ppx/agitation  diphenhydrAMINE   Injectable 50 milliGRAM(s) IntraMuscular once PRN severe agitation/eps ppx  haloperidol     Tablet 5 milliGRAM(s) Oral every 6 hours PRN agitation  haloperidol    Injectable 5 milliGRAM(s) IntraMuscular once PRN severe agitation  LORazepam     Tablet 2 milliGRAM(s) Oral every 6 hours PRN Agitation  LORazepam   Injectable 2 milliGRAM(s) IntraMuscular once PRN severe agitation  traZODone 50 milliGRAM(s) Oral at bedtime PRN insomnia

## 2021-05-25 NOTE — BH INPATIENT PSYCHIATRY ASSESSMENT NOTE - NSBHASSESSSUMMFT_PSY_ALL_CORE
33 yoa AAM with hx of admit and CPS involvement who presents as manic and psychotic, pressured and very paranoid; not willing to take medication;  Possible MOO needed    Will attempt trial of abilify ideally with [facility];  r/o SIMD vs SIPD

## 2021-05-25 NOTE — PSYCHIATRIC REHAB INITIAL EVALUATION - NSBHPRRECOMMEND_PSY_ALL_CORE
Writer met with patient to introduce patient to psych rehab staff and services.  Patient was minimally receptive to writer therefore writer obtained most information from patient's ED chart and observations of patient while on the unit.  As per patient's ED chart, patient was admitted to [place] [address] on 05/24/2021, brought in by EMS for agitation at home.  Patient resides in a private residence and reported he was having a dispute with his landlord.  Patient was minimally cooperative in the ED and answered questions with "N/A."  Patient denied AH/VH as well as HI/SI.  Patient reported adequate sleep and appetite.  As per patient's chart, patient has a past medical diagnosis of Depression and was previously on Zoloft while incarcerated several years ago.  Patient has no prior history of inpatient or outpatient treatment at this time.  Patient has no prior history of self-injurious behavior or suicide attempts.  Patient is observed wearing gowns on the unit and is not observed wearing a mask in response to the COVID -[address] [place] [address] wide mask mandate.      Writer will continue to engage patient daily to build therapeutic rapport and assist patient in psych rehab goals pertaining to maintaining medication and treatment compliance as well as attend daily psycho/education groups for improved symptom management within seven days.

## 2021-05-25 NOTE — BH INPATIENT PSYCHIATRY ASSESSMENT NOTE - NSBHMETABOLIC_PSY_ALL_CORE_FT
BMI:   HbA1c: A1C with Estimated Average Glucose Result: 5.3 % (05-25-21 @ 10:42)    Glucose:   BP: 120/68 (05-26-21 @ 06:50) (113/66 - 120/68)  Lipid Panel: Date/Time: 05-25-21 @ 10:42  Cholesterol, Serum: 162  Direct LDL: --  HDL Cholesterol, Serum: 70  Total Cholesterol/HDL Ration Measurement: --  Triglycerides, Serum: 57
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
## 2021-05-25 NOTE — BH SOCIAL WORK INITIAL PSYCHOSOCIAL EVALUATION - NSBHABUSEAPS_PSY_ALL_CORE
Pt reports he has two children in the foster care system. he does not give more information./Unknown

## 2021-05-25 NOTE — BH INPATIENT PSYCHIATRY ASSESSMENT NOTE - NSBHCHARTREVIEWVS_PSY_A_CORE FT
Vital Signs Last 24 Hrs  T(C): 36.6 (26 May 2021 20:38), Max: 36.7 (26 May 2021 06:50)  T(F): 97.8 (26 May 2021 20:38), Max: 98.1 (26 May 2021 06:50)  HR: --  BP: 120/68 (26 May 2021 06:50) (120/68 - 120/68)  BP(mean): 65 (26 May 2021 06:50) (65 - 65)  RR: --  SpO2: --

## 2021-05-25 NOTE — BH INPATIENT PSYCHIATRY ASSESSMENT NOTE - DESCRIPTION
Per record review: patient was adopted as a child. Has two children. Unemployed (states has financial support but did not disclose further).

## 2021-05-26 PROCEDURE — 99232 SBSQ HOSP IP/OBS MODERATE 35: CPT

## 2021-05-27 PROCEDURE — 99232 SBSQ HOSP IP/OBS MODERATE 35: CPT

## 2021-05-27 RX ORDER — ACETAMINOPHEN 500 MG
325 TABLET ORAL EVERY 6 HOURS
Refills: 0 | Status: DISCONTINUED | OUTPATIENT
Start: 2021-05-27 | End: 2021-07-02

## 2021-05-27 NOTE — BH INPATIENT PSYCHIATRY PROGRESS NOTE - OTHER
some PMA, no PMR angry pt narrative but improving Now fair, improving rapport with his MD Aggressive and antisocial content, now attenuating

## 2021-05-28 PROCEDURE — 99232 SBSQ HOSP IP/OBS MODERATE 35: CPT

## 2021-05-28 NOTE — PROVIDER CONTACT NOTE (OTHER) - SITUATION
Pt. playing basketball with male peer and was scratched during play in two places on right forearm. Injury is about 4cm in length with approximated edges and superficial depth.

## 2021-05-28 NOTE — PROVIDER CONTACT NOTE (OTHER) - RECOMMENDATIONS
Continue to monitor for infection. Keep injury clean and covered. Assessment & Interventions to maintain safety & therapeutic milieu.

## 2021-05-28 NOTE — BH INPATIENT PSYCHIATRY PROGRESS NOTE - OTHER
some PMA, no PMR angry pt narrative Aggressive and antisocial content, now attenuating superficially cooperative, guarded; mostly cooperative with interview but not with medication but improving Now fair, improving rapport with his MD

## 2021-05-28 NOTE — PROVIDER CONTACT NOTE (OTHER) - BACKGROUND
Pt. states "he didn't do it on purpose, but he needs to have his nails trimmed." Pt. is not angry and denies intent to retaliate.

## 2021-05-29 PROCEDURE — 99232 SBSQ HOSP IP/OBS MODERATE 35: CPT

## 2021-05-29 NOTE — BH INPATIENT PSYCHIATRY PROGRESS NOTE - OTHER
Now fair, improving rapport with his MD Aggressive and antisocial content, now attenuating angry pt narrative but improving some PMA, no PMR

## 2021-05-30 PROCEDURE — 99232 SBSQ HOSP IP/OBS MODERATE 35: CPT

## 2021-05-30 RX ORDER — DIPHENHYDRAMINE HCL 50 MG
50 CAPSULE ORAL ONCE
Refills: 0 | Status: DISCONTINUED | OUTPATIENT
Start: 2021-05-30 | End: 2021-05-31

## 2021-05-30 RX ORDER — HALOPERIDOL DECANOATE 100 MG/ML
5 INJECTION INTRAMUSCULAR ONCE
Refills: 0 | Status: DISCONTINUED | OUTPATIENT
Start: 2021-05-30 | End: 2021-05-31

## 2021-05-30 NOTE — BH INPATIENT PSYCHIATRY PROGRESS NOTE - OTHER
but improving some PMA, no PMR angry pt narrative Now fair, improving rapport with his MD Aggressive and antisocial content, now attenuating

## 2021-05-31 PROCEDURE — 99232 SBSQ HOSP IP/OBS MODERATE 35: CPT

## 2021-05-31 RX ADMIN — HALOPERIDOL DECANOATE 5 MILLIGRAM(S): 100 INJECTION INTRAMUSCULAR at 15:19

## 2021-05-31 RX ADMIN — Medication 2 MILLIGRAM(S): at 15:19

## 2021-05-31 RX ADMIN — Medication 50 MILLIGRAM(S): at 15:19

## 2021-05-31 NOTE — BH INPATIENT PSYCHIATRY PROGRESS NOTE - OTHER
but improving pt narrative Aggressive and antisocial content, now attenuating angry some PMA, no PMR Now fair, improving rapport with his MD

## 2021-06-01 PROCEDURE — 99232 SBSQ HOSP IP/OBS MODERATE 35: CPT

## 2021-06-01 RX ORDER — CHLORPROMAZINE HCL 10 MG
100 TABLET ORAL ONCE
Refills: 0 | Status: DISCONTINUED | OUTPATIENT
Start: 2021-06-01 | End: 2021-07-02

## 2021-06-01 RX ORDER — CHLORPROMAZINE HCL 10 MG
100 TABLET ORAL EVERY 4 HOURS
Refills: 0 | Status: DISCONTINUED | OUTPATIENT
Start: 2021-06-01 | End: 2021-07-02

## 2021-06-01 NOTE — BH INPATIENT PSYCHIATRY PROGRESS NOTE - OTHER
Argumentative and oppositional with poor insight some PMA, no PMR Aggressive and antisocial content, now attenuating but improving angry pt narrative

## 2021-06-02 PROCEDURE — 99232 SBSQ HOSP IP/OBS MODERATE 35: CPT

## 2021-06-02 NOTE — LEGAL STATUS PROGRESS NOTE - NSLEGALSTATUS_PSY_ALL_CORE
9.39 Emergency Admission
9.27 Involuntary (2PC) Converted from Emergency
9.39 Emergency Admission
Admission
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
## 2021-06-03 PROCEDURE — 99232 SBSQ HOSP IP/OBS MODERATE 35: CPT
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
## 2021-06-04 PROCEDURE — 99232 SBSQ HOSP IP/OBS MODERATE 35: CPT

## 2021-06-04 RX ADMIN — Medication 50 MILLIGRAM(S): at 06:28

## 2021-06-04 RX ADMIN — HALOPERIDOL DECANOATE 5 MILLIGRAM(S): 100 INJECTION INTRAMUSCULAR at 06:28

## 2021-06-04 RX ADMIN — Medication 2 MILLIGRAM(S): at 06:28
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
## 2021-06-07 PROCEDURE — 99232 SBSQ HOSP IP/OBS MODERATE 35: CPT
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
## 2021-06-08 PROCEDURE — 99232 SBSQ HOSP IP/OBS MODERATE 35: CPT

## 2021-06-08 RX ORDER — DIPHENHYDRAMINE HCL 50 MG
50 CAPSULE ORAL ONCE
Refills: 0 | Status: DISCONTINUED | OUTPATIENT
Start: 2021-06-08 | End: 2021-06-14

## 2021-06-08 RX ORDER — HALOPERIDOL DECANOATE 100 MG/ML
5 INJECTION INTRAMUSCULAR ONCE
Refills: 0 | Status: DISCONTINUED | OUTPATIENT
Start: 2021-06-08 | End: 2021-06-14

## 2021-06-08 RX ADMIN — Medication 2 MILLIGRAM(S): at 17:46

## 2021-06-08 RX ADMIN — Medication 100 MILLIGRAM(S): at 17:46
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
## 2021-06-09 PROCEDURE — 99232 SBSQ HOSP IP/OBS MODERATE 35: CPT
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
## 2021-06-10 RX ADMIN — ARIPIPRAZOLE 10 MILLIGRAM(S): 15 TABLET ORAL at 21:09
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
## 2021-06-11 PROCEDURE — 99232 SBSQ HOSP IP/OBS MODERATE 35: CPT

## 2021-06-11 RX ORDER — ARIPIPRAZOLE 15 MG/1
10 TABLET ORAL AT BEDTIME
Refills: 0 | Status: COMPLETED | OUTPATIENT
Start: 2021-06-11 | End: 2021-06-12

## 2021-06-11 RX ORDER — ARIPIPRAZOLE 15 MG/1
15 TABLET ORAL AT BEDTIME
Refills: 0 | Status: DISCONTINUED | OUTPATIENT
Start: 2021-06-13 | End: 2021-06-14

## 2021-06-11 RX ADMIN — ARIPIPRAZOLE 10 MILLIGRAM(S): 15 TABLET ORAL at 21:39

## 2021-06-11 NOTE — BH TREATMENT PLAN - NSTXSUBMISINTERRN_PSY_ALL_CORE
Assess mental status , rule out signs and symptoms associated with withdrawn . Discuss the importance to control behavior, emotions, and impulses.
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
## 2021-06-14 PROCEDURE — 99232 SBSQ HOSP IP/OBS MODERATE 35: CPT

## 2021-06-14 RX ORDER — ARIPIPRAZOLE 15 MG/1
10 TABLET ORAL AT BEDTIME
Refills: 0 | Status: DISCONTINUED | OUTPATIENT
Start: 2021-06-14 | End: 2021-06-17

## 2021-06-14 RX ORDER — MULTIVIT-MIN/FERROUS GLUCONATE 9 MG/15 ML
1 LIQUID (ML) ORAL AT BEDTIME
Refills: 0 | Status: DISCONTINUED | OUTPATIENT
Start: 2021-06-14 | End: 2021-07-02

## 2021-06-14 RX ADMIN — Medication 1 TABLET(S): at 20:35
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
## 2021-06-15 RX ORDER — OLANZAPINE 15 MG/1
10 TABLET, FILM COATED ORAL ONCE
Refills: 0 | Status: DISCONTINUED | OUTPATIENT
Start: 2021-06-15 | End: 2021-07-02

## 2021-06-15 RX ADMIN — ARIPIPRAZOLE 10 MILLIGRAM(S): 15 TABLET ORAL at 20:42

## 2021-06-15 RX ADMIN — Medication 1 TABLET(S): at 20:44
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
## 2021-06-16 PROCEDURE — 99232 SBSQ HOSP IP/OBS MODERATE 35: CPT

## 2021-06-16 RX ADMIN — Medication 1 TABLET(S): at 20:40

## 2021-06-16 RX ADMIN — ARIPIPRAZOLE 10 MILLIGRAM(S): 15 TABLET ORAL at 20:39

## 2021-06-16 RX ADMIN — Medication 325 MILLIGRAM(S): at 17:55
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
## 2021-06-17 PROCEDURE — 99232 SBSQ HOSP IP/OBS MODERATE 35: CPT

## 2021-06-17 RX ORDER — ARIPIPRAZOLE 15 MG/1
15 TABLET ORAL AT BEDTIME
Refills: 0 | Status: DISCONTINUED | OUTPATIENT
Start: 2021-06-17 | End: 2021-06-21

## 2021-06-17 RX ADMIN — Medication 1 TABLET(S): at 20:31

## 2021-06-17 RX ADMIN — ARIPIPRAZOLE 15 MILLIGRAM(S): 15 TABLET ORAL at 20:31
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
## 2021-06-18 PROCEDURE — 73130 X-RAY EXAM OF HAND: CPT | Mod: 26,RT

## 2021-06-18 PROCEDURE — 99232 SBSQ HOSP IP/OBS MODERATE 35: CPT

## 2021-06-18 RX ORDER — IBUPROFEN 200 MG
600 TABLET ORAL EVERY 6 HOURS
Refills: 0 | Status: DISCONTINUED | OUTPATIENT
Start: 2021-06-18 | End: 2021-06-23

## 2021-06-18 RX ADMIN — Medication 1 TABLET(S): at 20:27

## 2021-06-18 RX ADMIN — Medication 325 MILLIGRAM(S): at 12:18

## 2021-06-18 RX ADMIN — ARIPIPRAZOLE 15 MILLIGRAM(S): 15 TABLET ORAL at 20:28

## 2021-06-18 RX ADMIN — Medication 325 MILLIGRAM(S): at 11:57
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
## 2021-06-19 RX ADMIN — ARIPIPRAZOLE 15 MILLIGRAM(S): 15 TABLET ORAL at 20:20

## 2021-06-19 RX ADMIN — Medication 1 TABLET(S): at 20:20

## 2021-06-20 RX ADMIN — Medication 1 TABLET(S): at 20:35

## 2021-06-20 RX ADMIN — ARIPIPRAZOLE 15 MILLIGRAM(S): 15 TABLET ORAL at 20:36

## 2021-06-21 PROCEDURE — 99232 SBSQ HOSP IP/OBS MODERATE 35: CPT

## 2021-06-21 RX ORDER — ARIPIPRAZOLE 15 MG/1
20 TABLET ORAL AT BEDTIME
Refills: 0 | Status: DISCONTINUED | OUTPATIENT
Start: 2021-06-21 | End: 2021-06-29

## 2021-06-21 RX ORDER — ARIPIPRAZOLE 15 MG/1
1 TABLET ORAL
Qty: 30 | Refills: 0
Start: 2021-06-21 | End: 2021-07-20

## 2021-06-21 RX ADMIN — ARIPIPRAZOLE 20 MILLIGRAM(S): 15 TABLET ORAL at 21:19

## 2021-06-21 RX ADMIN — Medication 1 TABLET(S): at 21:19
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
## 2021-06-21 NOTE — BH PSYCHOLOGY - GROUP THERAPY NOTE - NSPSYCHOLGRPCOGGOAL_PSY_A_CORE FT
Decrease symptoms, Develop coping/emotion regulation skills, Psychoeducation
Decrease symptoms, Develop coping/emotion regulation skills, Psychoeducation

## 2021-06-21 NOTE — BH PSYCHOLOGY - GROUP THERAPY NOTE - NSPSYCHOLGRPCOGINT_PSY_A_CORE FT
Cognitive/behavioral therapy, Emotion regulation/coping skills taught, Psychoed
Cognitive/behavioral therapy, Emotion regulation/coping skills taught, Psychoed

## 2021-06-21 NOTE — BH PSYCHOLOGY - GROUP THERAPY NOTE - NSPSYCHOLGRPCOGPT_PSY_A_CORE FT
Patient attended Cognitive Behavioral Therapy Group. The group started with a brief check-in during which Pts were asked to share one of their favorite qualities about themselves. The group then focused on the topic of important opposites to balance, like openness and privacy, focusing on themselves and on others as well as both taking from others and giving to others. Group also discussed ways that they can take care of their mind and body through balanced sleep, eating, and exercise.  explained concepts, reinforced participation, and engaged patients in the discussion.  
Patient attended Cognitive Behavioral Therapy Group. The group started with a brief check-in during which Pts were asked to share an interesting fact about them that others may not know. The group then focused on the topic of myths or harmful beliefs about emotions and how to challenge these beliefs to formulate healthier ones. Group members were also encouraged to discuss their hopes and goals for their hospitalization and treatment.  explained concepts, reinforced participation, and engaged patients in the discussion.

## 2021-06-21 NOTE — BH PSYCHOLOGY - GROUP THERAPY NOTE - NSBHPSYCHOLRESPCOMMENT_PSY_A_CORE FT
Pt appeared adequately groomed and casually dressed. Pt appeared very engaged in group discussion as evidenced by his willingness to meaningfully engage in the group topic. During check-in, Pt expressed that he is athletic and enjoys playing basketball. Pt shared that he has been told feeling his emotions and expressing them means he’s weak, particularly as a male, however he was able to recognize that feeling anger, sadness, or any other emotion is normal and part of being human. Pt expressed his thoughts about working towards his goals while in the hospital and sees learning about what he needs is part of his own journey. Pt agreed to read from the worksheet. Speech was WNL. PT was oriented X3. PT was appropriate with others.    
Pt appeared adequately groomed and casually dressed. Pt appeared very engaged in group discussion as evidenced by his willingness to meaningfully engage in the group topic. During check-in, Pt expressed that his favorite quality is his perseverance, noting that he never gives up once he sets his mind to something. Pt shared that he struggles to accept the reality of being in the hospital and needing to take medications, adding that he wants to work to change things. Pt agreed that self-acceptance is important to focus on as he internalizes negative comments of others. Lastly, Pt recognized the impacts of taking drugs to “deal with the pain of things.” Pt agreed to read from the worksheet. Speech was pressured at times. PT was oriented X3. PT was appropriate with others.

## 2021-06-21 NOTE — BH PSYCHOLOGY - GROUP THERAPY NOTE - NSPSYCHOLGRPCOGPROB_PSY_A_CORE FT
Anxiety, Depression, Emotion dysregulation, Lack of coping skills
Anxiety, Depression, Emotion dysregulation, Lack of coping skills

## 2021-06-21 NOTE — BH PSYCHOLOGY - GROUP THERAPY NOTE - NSPSYCHOLGRPCOGINT_PSY_A_CORE
group members provided support/group members suggested positive behaviors/mindfulness skills taught/relaxation skills practiced/other..
group members provided support/group members suggested positive behaviors/mindfulness skills taught/relaxation skills practiced/other..

## 2021-06-22 PROCEDURE — 99232 SBSQ HOSP IP/OBS MODERATE 35: CPT

## 2021-06-22 RX ADMIN — ARIPIPRAZOLE 20 MILLIGRAM(S): 15 TABLET ORAL at 20:26

## 2021-06-22 RX ADMIN — Medication 1 TABLET(S): at 20:26
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
## 2021-06-23 PROCEDURE — 99232 SBSQ HOSP IP/OBS MODERATE 35: CPT

## 2021-06-23 RX ADMIN — Medication 1 TABLET(S): at 21:01

## 2021-06-23 RX ADMIN — ARIPIPRAZOLE 20 MILLIGRAM(S): 15 TABLET ORAL at 21:01

## 2021-06-23 RX ADMIN — Medication 2 MILLIGRAM(S): at 13:15

## 2021-06-23 RX ADMIN — Medication 500 MILLIGRAM(S): at 08:46

## 2021-06-23 RX ADMIN — Medication 100 MILLIGRAM(S): at 13:15

## 2021-06-23 RX ADMIN — Medication 500 MILLIGRAM(S): at 10:15
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
## 2021-06-24 PROCEDURE — 99232 SBSQ HOSP IP/OBS MODERATE 35: CPT

## 2021-06-24 RX ADMIN — Medication 500 MILLIGRAM(S): at 20:40

## 2021-06-24 RX ADMIN — ARIPIPRAZOLE 20 MILLIGRAM(S): 15 TABLET ORAL at 20:40

## 2021-06-24 RX ADMIN — Medication 1 TABLET(S): at 20:40
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
## 2021-06-25 PROCEDURE — 99232 SBSQ HOSP IP/OBS MODERATE 35: CPT

## 2021-06-25 RX ADMIN — Medication 1 TABLET(S): at 20:36

## 2021-06-25 RX ADMIN — ARIPIPRAZOLE 20 MILLIGRAM(S): 15 TABLET ORAL at 20:36

## 2021-06-25 RX ADMIN — Medication 500 MILLIGRAM(S): at 08:12

## 2021-06-25 RX ADMIN — Medication 500 MILLIGRAM(S): at 09:14

## 2021-06-25 NOTE — BH TREATMENT PLAN - NSTXDEPRESGOAL_PSY_ALL_CORE
Exhibit improvements in self-grooming, hygiene, sleep and appetite
Will identify thoughts and self-talk that contribute to depression

## 2021-06-25 NOTE — BH TREATMENT PLAN - NSTXSUBMISGOAL_PSY_ALL_CORE
Be able to acknowledge that substance abuse is a problem
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
## 2021-06-25 NOTE — BH TREATMENT PLAN - NSTXDCOPNOINTERMD_PSY_ALL_CORE
Psychopharm with goal of UMAÑA and supportive therapy now with RETENTION+MOO
Psychopharm with goal of UMAÑA and supportive therapy
Psychopharm with goal of UMAÑA and supportive therapy now with RETENTION+MOO

## 2021-06-25 NOTE — BH TREATMENT PLAN - NSTXDCOPLKINTERSW_PSY_ALL_CORE
SW will continue to work with patient to educate and coordinate appropriate outpatient services and encourage patient to comply with follow up care in the community.
SW will provide support, education and ongoing discussion of dc plans to patient.
SW will provide support, education and ongoing discussion of dc plans to patient.
SW will continue to provide support, education and ongoing discussion of dc plans, and encourage pt to accept PO medication.
SW will continue to provide support, education and ongoing discussion of dc plans, and encourage pt to accept PO medication.

## 2021-06-25 NOTE — BH TREATMENT PLAN - NSTXCONDUCGOAL_PSY_ALL_CORE
Will develop more adaptive coping strategies to manage stress
Will describe and accept responsibility for 1 problem behavior

## 2021-06-25 NOTE — BH TREATMENT PLAN - NSTXANXGOAL_PSY_ALL_CORE
Be able to participate in activities despite lingering anxiety/panic
Be able to participate in activities despite lingering anxiety/panic
Be able to perform ADLs and maintain safety despite anxiety/panic daily
Be able to perform ADLs and maintain safety despite anxiety/panic daily

## 2021-06-25 NOTE — BH TREATMENT PLAN - NSTXDCSOCINTERMD_PSY_ALL_CORE
Psychopharm with goal of UMAÑA and supportive therapy
Psychopharm with goal of UMAÑA and supportive therapy

## 2021-06-25 NOTE — BH TREATMENT PLAN - NSTXPSYCHOGOAL_PSY_ALL_CORE
State that he/she is only about 50% sure of the certainty of the delusions instead of 100% certain
Will be able to report experiencing hallucinations to staff
State that he/she is only about 50% sure of the certainty of the delusions instead of 100% certain
Will be able to report experiencing hallucinations to staff
Will be able to report experiencing hallucinations to staff

## 2021-06-25 NOTE — BH TREATMENT PLAN - NSTXVIOLNTINTERRN_PSY_ALL_CORE
Encouraged to adopt better means of coping with situation that is not in his favor safely that acting out aggressively.

## 2021-06-25 NOTE — BH TREATMENT PLAN - NSTXVIOLNTGOAL_PSY_ALL_CORE
Will decrease the number/duration/intensity of angry/aggressive outbursts
Will be able to express understanding of at least one trigger to their aggressive behavior
Will decrease the number/duration/intensity of angry/aggressive outbursts
Will be able to express understanding of at least one trigger to their aggressive behavior
Will be able to express understanding of at least one trigger to their aggressive behavior

## 2021-06-25 NOTE — BH TREATMENT PLAN - NSTXMANICGOAL_PSY_ALL_CORE
Exhibit a substantial reduction in elated/angry acting out, and pressured speech that prevents mutual conversation
Be able to identify the early signs of ruthie (e.g. sleep and mood changes) and to employ coping strategies to minimize acting out
Exhibit a substantial reduction in elated/angry acting out, and pressured speech that prevents mutual conversation

## 2021-06-25 NOTE — BH TREATMENT PLAN - NSBHPRIMARYDX_PSY_ALL_CORE
Psychosis not due to substance or known physiological condition    
Psychosis not due to substance or known physiological condition    
Bipolar I disorder, mild, current or most recent episode manic, with psychotic features, with mood-congruent psychotic features    
Psychosis not due to substance or known physiological condition

## 2021-06-25 NOTE — BH TREATMENT PLAN - NSTXIMPULSINTERMD_PSY_ALL_CORE
Psychopharm with goal of UMAÑA and supportive therapy now with RETENTION+MOO
Psychopharm with goal of UMAÑA and supportive therapy now with RETENTION+MOO
Psychopharm with goal of UMAÑA and supportive therapy

## 2021-06-25 NOTE — BH TREATMENT PLAN - NSTXDCFAMINTERSW_PSY_ALL_CORE
Support and psychoed to be provided and d/c plan to be arranged.

## 2021-06-25 NOTE — BH TREATMENT PLAN - NSTXPROBDCSOC_PSY_ALL_CORE
DISCHARGE ISSUE - POOR SOCIALIZATION IN COMMUNITY

## 2021-06-25 NOTE — BH TREATMENT PLAN - NSTXCOPEINTERMD_PSY_ALL_CORE
Psychopharm with goal of UMAÑA and supportive therapy now with RETENTION+MOO
Psychopharm with goal of UMAÑA and supportive therapy
1
Psychopharm with goal of UMAÑA and supportive therapy now with RETENTION+MOO

## 2021-06-25 NOTE — BH TREATMENT PLAN - NSTXCAREGIVERAGREEMENT_PSY_P_CORE
Patient does not have family/caregiver involved in care

## 2021-06-25 NOTE — BH TREATMENT PLAN - NSTXIMPULSGOAL_PSY_ALL_CORE
Will be able to demonstrate the ability to pause before acting out negatively
Will be able to demonstrate the ability to pause before acting out negatively
Will be able to recognize 2+ triggers
Will be able to demonstrate the ability to pause before acting out negatively
Will be able to demonstrate the ability to pause before acting out negatively
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
## 2021-06-25 NOTE — BH TREATMENT PLAN - NSTXPSYCHOINTERRN_PSY_ALL_CORE
Assess mental status, mood and behavioral patterns. Redirect/reorient as needed. Encourage pt to verbalize thoughts and feelings.

## 2021-06-25 NOTE — BH TREATMENT PLAN - NSCMSPTSTRENGTHS_PSY_ALL_CORE
Assertive/Courageous/Expressive of emotions/Flexibility/Humor/Interpersonal skills/Motivated/Physically healthy/Resourceful
Assertive/Courageous/Expressive of emotions/Humor/Interpersonal skills/Physically healthy
Assertive/Courageous/Expressive of emotions/Physically healthy
Assertive/Courageous/Expressive of emotions/Humor/Physically healthy
Assertive/Courageous/Expressive of emotions/Humor/Interpersonal skills/Motivated/Physically healthy

## 2021-06-25 NOTE — BH TREATMENT PLAN - NSTXCAREGIVERPARTICIPATE_PSY_P_CORE
No, patient unwilling to involve family/caregiver

## 2021-06-25 NOTE — BH TREATMENT PLAN - NSTXDCSOCGOAL_PSY_ALL_CORE
Will accept referrals to support groups, clubhouse, senior centers, etc.

## 2021-06-25 NOTE — BH TREATMENT PLAN - NSTXMEDICINTERRN_PSY_ALL_CORE
Encourage medication compliance.
Educated and encouraged pt to comply with medication regimen goals. Provided medication teaching, monitored side effects and response. Encouraged to attend medication groups.
Encourage medication compliance.
Educated and encouraged pt to comply with medication regimen goals. Provided medication teaching, monitored side effects and response. Encouraged to attend medication groups.
Educated and encouraged pt to comply with medication regimen goals. Provided medication teaching, monitored side effects and response. Encouraged to attend medication groups.

## 2021-06-25 NOTE — BH TREATMENT PLAN - NSTXMEDICGOAL_PSY_ALL_CORE
Take all medications as prescribed
Be able to state dosages and times to take medications
Be able to describe the benefit of medication/treatment

## 2021-06-25 NOTE — BH TREATMENT PLAN - NSTXPROBMEDIC_PSY_ALL_CORE
MEDICATION/TREATMENT NON-COMPLIANCE

## 2021-06-25 NOTE — BH TREATMENT PLAN - NSTXDCFAMGOAL_PSY_ALL_CORE
Will agree to involve supports/family in treatment and discharge planning

## 2021-06-25 NOTE — BH TREATMENT PLAN - NSTXMEDICINTERPR_PSY_ALL_CORE
Patient has demonstrated no progress towards psychiatric rehabilitation goal of medication adherence. Patient has not been taking his medication, stating “I didn’t come in here on meds so I don’t need to take meds.” Psychiatric rehabilitation recommends patient continue to attend groups, engage in individual sessions, and participate in activities in the milieu to better understand the benefits of medication/treatment adherence.
Patient has demonstrated no progress towards psychiatric rehabilitation goal of medication adherence. Patient has not been taking his medication, stating “I didn’t come in here on meds so I don’t need to take meds.” Psychiatric rehabilitation recommends patient continue to attend groups, engage in individual sessions, and participate in activities in the milieu to better understand the benefits of medication/treatment adherence.
Writer will continue to engage patient daily to build therapeutic rapport and assist patient in psych rehab goals pertaining to maintaining medication and treatment compliance as well as attend daily psycho/education groups for improved symptom management within seven days.
Patient has demonstrated no progress towards psychiatric rehabilitation goal of medication adherence. Patient has not been taking his medication, stating “I didn’t come in here on meds so I don’t need to take meds.” Psychiatric rehabilitation recommends patient continue to attend groups, engage in individual sessions, and participate in activities in the milieu to better understand the benefits of medication/treatment adherence.
Patient has demonstrated progress towards psychiatric rehabilitation goal of medication adherence. Patient has been taking medication due to a court order. Patient continues to demonstrate poor insight into symptoms and poor understanding of the importance of adhering to medication. Psychiatric rehabilitation recommends patient continue to attend groups, engage in individual sessions, and participate in activities in the milieu to better understand the benefits of medication/treatment adherence.

## 2021-06-25 NOTE — BH TREATMENT PLAN - NSTXPROBDCFAM_PSY_ALL_CORE
DISCHARGE ISSUE - POOR ENGAGEMENT WITH SUPPORTS/FAMILY

## 2021-06-25 NOTE — BH TREATMENT PLAN - NSTXPROBVIOLNT_PSY_ALL_CORE
VIOLENT/AGGRESSIVE BEHAVIOR

## 2021-06-25 NOTE — BH TREATMENT PLAN - NSTXIMPULSINTERRN_PSY_ALL_CORE
Assess mental status, mood and behavioral patterns. Redirect/reorient as needed. De-escalate verbally or medically as needed.  Encourage pt to verbalize thoughts and feelings.

## 2021-06-25 NOTE — BH TREATMENT PLAN - NSPTSTATEDGOAL_PSY_ALL_CORE
I want to go home without any medication.

## 2021-06-26 RX ADMIN — ARIPIPRAZOLE 20 MILLIGRAM(S): 15 TABLET ORAL at 20:36

## 2021-06-26 RX ADMIN — Medication 1 TABLET(S): at 16:36

## 2021-06-26 RX ADMIN — Medication 325 MILLIGRAM(S): at 17:05

## 2021-06-26 RX ADMIN — Medication 325 MILLIGRAM(S): at 16:36

## 2021-06-27 RX ADMIN — ARIPIPRAZOLE 20 MILLIGRAM(S): 15 TABLET ORAL at 21:12

## 2021-06-27 RX ADMIN — Medication 1 TABLET(S): at 21:12

## 2021-06-28 PROCEDURE — 99232 SBSQ HOSP IP/OBS MODERATE 35: CPT

## 2021-06-28 RX ADMIN — ARIPIPRAZOLE 20 MILLIGRAM(S): 15 TABLET ORAL at 20:23

## 2021-06-28 RX ADMIN — Medication 1 TABLET(S): at 20:24
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
## 2021-06-29 PROCEDURE — 99232 SBSQ HOSP IP/OBS MODERATE 35: CPT

## 2021-06-29 RX ORDER — ARIPIPRAZOLE 15 MG/1
30 TABLET ORAL AT BEDTIME
Refills: 0 | Status: COMPLETED | OUTPATIENT
Start: 2021-06-30 | End: 2021-06-30

## 2021-06-29 RX ORDER — ARIPIPRAZOLE 15 MG/1
675 TABLET ORAL ONCE
Refills: 0 | Status: COMPLETED | OUTPATIENT
Start: 2021-06-30 | End: 2021-06-30

## 2021-06-29 RX ADMIN — Medication 1 TABLET(S): at 21:14

## 2021-06-29 RX ADMIN — ARIPIPRAZOLE 20 MILLIGRAM(S): 15 TABLET ORAL at 21:12
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
## 2021-06-29 NOTE — BH TREATMENT PLAN - NSTXMEDICINTERMD_PSY_ALL_CORE
Psychopharm with goal of UMAÑA and supportive therapy now with RETENTION+MOO
Psychopharm with goal of UMAÑA and supportive therapy
Psychopharm with goal of UMAÑA and supportive therapy now with RETENTION+MOO
Infliximab Counseling:  I discussed with the patient the risks of infliximab including but not limited to myelosuppression, immunosuppression, autoimmune hepatitis, demyelinating diseases, lymphoma, and serious infections.  The patient understands that monitoring is required including a PPD at baseline and must alert us or the primary physician if symptoms of infection or other concerning signs are noted.

## 2021-06-30 PROCEDURE — 99232 SBSQ HOSP IP/OBS MODERATE 35: CPT

## 2021-06-30 RX ORDER — ARIPIPRAZOLE 15 MG/1
882 TABLET ORAL ONCE
Refills: 0 | Status: COMPLETED | OUTPATIENT
Start: 2021-07-02 | End: 2021-07-02

## 2021-06-30 RX ADMIN — ARIPIPRAZOLE 675 MILLIGRAM(S): 15 TABLET ORAL at 18:21

## 2021-06-30 RX ADMIN — Medication 1 TABLET(S): at 21:32

## 2021-06-30 RX ADMIN — ARIPIPRAZOLE 30 MILLIGRAM(S): 15 TABLET ORAL at 21:33
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
## 2021-07-01 PROCEDURE — 99232 SBSQ HOSP IP/OBS MODERATE 35: CPT

## 2021-07-01 RX ADMIN — Medication 325 MILLIGRAM(S): at 10:00

## 2021-07-01 RX ADMIN — Medication 325 MILLIGRAM(S): at 09:29

## 2021-07-01 RX ADMIN — Medication 1 TABLET(S): at 20:59
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
## 2021-07-02 VITALS — TEMPERATURE: 98 F

## 2021-07-02 PROCEDURE — 99239 HOSP IP/OBS DSCHRG MGMT >30: CPT

## 2021-07-02 RX ORDER — ARIPIPRAZOLE 15 MG/1
882 TABLET ORAL
Qty: 1 | Refills: 0
Start: 2021-07-02 | End: 2021-07-02

## 2021-07-02 RX ORDER — MULTIVIT-MIN/FERROUS GLUCONATE 9 MG/15 ML
1 LIQUID (ML) ORAL
Qty: 30 | Refills: 0
Start: 2021-07-02 | End: 2021-07-31

## 2021-07-02 RX ADMIN — ARIPIPRAZOLE 882 MILLIGRAM(S): 15 TABLET ORAL at 09:44

## 2021-07-02 NOTE — BH INPATIENT PSYCHIATRY PROGRESS NOTE - NSTXSUBMISDATEEST_PSY_ALL_CORE
16-Jun-2021
02-Jun-2021
16-Jun-2021
02-Jun-2021
16-Jun-2021
16-Jun-2021
02-Jun-2021
02-Jun-2021
16-Jun-2021
02-Jun-2021
16-Jun-2021
16-Jun-2021
02-Jun-2021
16-Jun-2021
16-Jun-2021
02-Jun-2021
02-Jun-2021
16-Jun-2021

## 2021-07-02 NOTE — BH INPATIENT PSYCHIATRY PROGRESS NOTE - NSTXPROBDCOPLK_PSY_ALL_CORE
DISCHARGE ISSUE - LACK OF APPROPRIATE OUTPATIENT SERVICES

## 2021-07-02 NOTE — BH INPATIENT PSYCHIATRY PROGRESS NOTE - NSTXPROBANX_PSY_ALL_CORE
ANXIETY/PANIC/FEAR

## 2021-07-02 NOTE — BH INPATIENT PSYCHIATRY PROGRESS NOTE - NSTXMANICDATETRGT_PSY_ALL_CORE
09-Jun-2021
23-Jun-2021
09-Jun-2021
23-Jun-2021
09-Jun-2021
23-Jun-2021
09-Jun-2021
23-Jun-2021

## 2021-07-02 NOTE — BH INPATIENT PSYCHIATRY PROGRESS NOTE - NSTXMEDICDATETRGT_PSY_ALL_CORE
14-Jun-2021
28-Jun-2021
14-Jun-2021
31-May-2021
07-Jun-2021
07-Jun-2021
05-Jul-2021
14-Jun-2021
31-May-2021
05-Jul-2021
28-Jun-2021
23-Jun-2021
07-Jun-2021
14-Jun-2021
07-Jun-2021
07-Jun-2021
28-Jun-2021
14-Jun-2021
23-Jun-2021
28-Jun-2021
23-Jun-2021
05-Jul-2021
05-Jul-2021
28-Jun-2021
05-Jul-2021

## 2021-07-02 NOTE — BH INPATIENT PSYCHIATRY PROGRESS NOTE - NSTXDCFAMPROGRES_PSY_ALL_CORE
No Change

## 2021-07-02 NOTE — BH INPATIENT PSYCHIATRY PROGRESS NOTE - NSTXDCFAMGOAL_PSY_ALL_CORE
Will agree to involve supports/family in treatment and discharge planning

## 2021-07-02 NOTE — BH INPATIENT PSYCHIATRY PROGRESS NOTE - NSTXMANICPROGRES_PSY_ALL_CORE
Improving
No Change
Improving
No Change
No Change
Improving
No Change
No Change
Improving

## 2021-07-02 NOTE — BH INPATIENT PSYCHIATRY PROGRESS NOTE - NSTXVIOLNTGOAL_PSY_ALL_CORE
Will decrease the number/duration/intensity of angry/aggressive outbursts
Will be able to express understanding of at least one trigger to their aggressive behavior
Will be able to express understanding of at least one trigger to their aggressive behavior
Will decrease the number/duration/intensity of angry/aggressive outbursts
Will be able to express understanding of at least one trigger to their aggressive behavior
Will be able to express understanding of at least one trigger to their aggressive behavior
Will decrease the number/duration/intensity of angry/aggressive outbursts
Will be able to express understanding of at least one trigger to their aggressive behavior
Will be able to express understanding of at least one trigger to their aggressive behavior
Will decrease the number/duration/intensity of angry/aggressive outbursts
Will be able to express understanding of at least one trigger to their aggressive behavior
Will decrease the number/duration/intensity of angry/aggressive outbursts
Will be able to express understanding of at least one trigger to their aggressive behavior
Will decrease the number/duration/intensity of angry/aggressive outbursts
Will be able to express understanding of at least one trigger to their aggressive behavior
Will decrease the number/duration/intensity of angry/aggressive outbursts
Will decrease the number/duration/intensity of angry/aggressive outbursts
Will be able to express understanding of at least one trigger to their aggressive behavior
Will decrease the number/duration/intensity of angry/aggressive outbursts
Will be able to express understanding of at least one trigger to their aggressive behavior
Will be able to express understanding of at least one trigger to their aggressive behavior

## 2021-07-02 NOTE — BH INPATIENT PSYCHIATRY PROGRESS NOTE - NSTXMANICGOAL_PSY_ALL_CORE
Exhibit a substantial reduction in elated/angry acting out, and pressured speech that prevents mutual conversation
Be able to identify the early signs of ruthie (e.g. sleep and mood changes) and to employ coping strategies to minimize acting out
Be able to identify the early signs of ruthie (e.g. sleep and mood changes) and to employ coping strategies to minimize acting out
Exhibit a substantial reduction in elated/angry acting out, and pressured speech that prevents mutual conversation
Be able to identify the early signs of ruthie (e.g. sleep and mood changes) and to employ coping strategies to minimize acting out
Exhibit a substantial reduction in elated/angry acting out, and pressured speech that prevents mutual conversation
Be able to identify the early signs of ruthie (e.g. sleep and mood changes) and to employ coping strategies to minimize acting out
Exhibit a substantial reduction in elated/angry acting out, and pressured speech that prevents mutual conversation

## 2021-07-02 NOTE — BH INPATIENT PSYCHIATRY PROGRESS NOTE - NSTXPROBDCOPNO_PSY_ALL_CORE
DISCHARGE ISSUE - NON-ADHERENT WITH OUTPATIENT SERVICES

## 2021-07-02 NOTE — BH INPATIENT PSYCHIATRY PROGRESS NOTE - NSBHANTIPSYCHOTIC_PSY_ALL_CORE
Yes...

## 2021-07-02 NOTE — BH INPATIENT PSYCHIATRY PROGRESS NOTE - NSBHASSESSSUMMFT_PSY_ALL_CORE
33 yoa AAM with hx of admit and CPS involvement who presents as manic and psychotic, pressured and very paranoid; not willing to take medication;  Possible MOO needed    Will attempt trial of abilify ideally with UMAÑA but notcompliant  r/o SIMD vs SIPD  MOO application submitted, Court pending 6/15/21
33 yoa AAM with hx of admit and CPS involvement who presents as manic and psychotic, pressured and very paranoid; not willing to take medication;  Possible MOO needed    Will attempt trial of abilify ideally with UMAÑA;  r/o SIMD vs SIPD
33 yoa AAM with hx of admit and CPS involvement who presents as manic and psychotic, pressured and very paranoid; not willing to take medication;  Possible MOO needed    Will attempt trial of abilify ideally with UMAÑA;  r/o SIMD vs SIPD
33 yoa AAM with hx of admit and CPS involvement who presents as manic and psychotic, pressured and very paranoid; not willing to take medication;  Possible MOO needed    Will attempt trial of abilify ideally with UMAÑA but notcompliant  r/o SIMD vs SIPD  MOO application submitted, Court pending 6/15/21
33 yoa AAM with hx of admit and CPS involvement who presents as manic and psychotic, pressured and very paranoid; not willing to take medication;  Possible MOO needed    Will attempt trial of abilify ideally with UMAÑA but notcompliant  r/o SIMD vs SIPD  MOO application submitted, Court pending 6/15/21
33 yoa AAM with hx of admit and CPS involvement who presents as manic and psychotic, pressured and very paranoid; not willing to take medication;  Possible MOO needed    Will attempt trial of abilify ideally with UMAÑA;  r/o SIMD vs SIPD
33 yoa AAM with hx of admit and CPS involvement who presents as manic and psychotic, pressured and very paranoid; not willing to take medication;  Possible MOO needed    Will attempt trial of abilify ideally with UMAÑA;  r/o SIMD vs SIPD
33 yoa AAM with hx of admit and CPS involvement who presents as manic and psychotic, pressured and very paranoid; not willing to take medication;  Possible MOO needed    Will attempt trial of abilify ideally with UMAÑA but notcompliant  r/o SIMD vs SIPD  MOO application submitted, Court pending 6/15/21
33 yoa AAM with hx of admit and CPS involvement who presents as manic and psychotic, pressured and very paranoid; not willing to take medication;  Possible MOO needed    Will attempt trial of abilify ideally with UMAÑA;  r/o SIMD vs SIPD
33 yoa AAM with hx of admit and CPS involvement who presents as manic and psychotic, pressured and very paranoid; not willing to take medication;  Possible MOO needed    Will attempt trial of abilify ideally with UMAÑA but notcompliant  r/o SIMD vs SIPD  MOO application submitted, Court pending 6/15/21
33 yoa AAM with hx of admit and CPS involvement who presents as manic and psychotic, pressured and very paranoid; not willing to take medication;  Possible MOO needed    Will attempt trial of abilify ideally with UMAÑA;  r/o SIMD vs SIPD
33 yoa AAM with hx of admit and CPS involvement who presents as manic and psychotic, pressured and very paranoid; not willing to take medication;  Possible MOO needed    Will attempt trial of abilify ideally with UMAÑA but notcompliant  r/o SIMD vs SIPD  MOO application submitted, Court pending 6/15/21
33 yoa AAM with hx of admit and CPS involvement who presents as manic and psychotic, pressured and very paranoid; not willing to take medication;  Possible MOO needed    Will attempt trial of abilify ideally with UMAÑA but notcompliant  r/o SIMD vs SIPD  MOO application submitted, Court pending 6/15/21
33 yoa AAM with hx of admit and CPS involvement who presents as manic and psychotic, pressured and very paranoid; not willing to take medication;  Possible MOO needed    Will attempt trial of abilify ideally with UMAÑA;  r/o SIMD vs SIPD
33 yoa AAM with hx of admit and CPS involvement who presents as manic and psychotic, pressured and very paranoid; not willing to take medication;  Possible MOO needed    Will attempt trial of abilify ideally with UMAÑA;  r/o SIMD vs SIPD
33 yoa AAM with hx of admit and CPS involvement who presents as manic and psychotic, pressured and very paranoid; not willing to take medication;  Possible MOO needed    Will attempt trial of abilify ideally with UMAÑA but notcompliant  r/o SIMD vs SIPD  MOO application submitted, Court pending 6/15/21
33 yoa AAM with hx of admit and CPS involvement who presents as manic and psychotic, pressured and very paranoid; not willing to take medication;  Possible MOO needed    Will attempt trial of abilify ideally with UMAÑA but notcompliant  r/o SIMD vs SIPD  MOO application submitted, Court pending 6/15/21
33 yoa AAM with hx of admit and CPS involvement who presents as manic and psychotic, pressured and very paranoid; not willing to take medication;  Possible MOO needed    Will attempt trial of abilify ideally with UMAÑA;  r/o SIMD vs SIPD
33 yoa AAM with hx of admit and CPS involvement who presents as manic and psychotic, pressured and very paranoid; not willing to take medication;  Possible MOO needed    Will attempt trial of abilify ideally with UMAÑA;  r/o SIMD vs SIPD
33 yoa AAM with hx of admit and CPS involvement who presents as manic and psychotic, pressured and very paranoid; not willing to take medication;  Possible MOO needed    Will attempt trial of abilify ideally with UMAÑA but notcompliant  r/o SIMD vs SIPD  MOO application submitted, Court pending 6/15/21

## 2021-07-02 NOTE — BH INPATIENT PSYCHIATRY PROGRESS NOTE - NSBHMSETHTCONTENT_PSY_A_CORE
Delusions/Other

## 2021-07-02 NOTE — BH INPATIENT PSYCHIATRY PROGRESS NOTE - NSBHMSESPEECH_PSY_A_CORE
Abnormal as indicated, otherwise normal...
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
## 2021-07-02 NOTE — BH INPATIENT PSYCHIATRY PROGRESS NOTE - NSBHMSEINSIGHT_PSY_A_CORE
Poor
Fair
Poor
Fair
Poor
Fair
Poor

## 2021-07-02 NOTE — BH INPATIENT PSYCHIATRY PROGRESS NOTE - NSBHMSEAFFCONG_PSY_A_CORE
Congruent

## 2021-07-02 NOTE — BH INPATIENT PSYCHIATRY PROGRESS NOTE - NSTXDCOPLKDATETRGT_PSY_ALL_CORE
01-Jun-2021
01-Jun-2021
25-Jun-2021
25-Jun-2021
16-Jun-2021
16-Jun-2021
02-Jul-2021
01-Jun-2021
02-Jul-2021
16-Jun-2021
16-Jun-2021
01-Jun-2021
01-Jun-2021
25-Jun-2021
01-Jun-2021
01-Jun-2021
16-Jun-2021
02-Jul-2021
01-Jun-2021
02-Jul-2021
16-Jun-2021
16-Jun-2021
02-Jul-2021
25-Jun-2021
25-Jun-2021
02-Jul-2021

## 2021-07-02 NOTE — BH INPATIENT PSYCHIATRY PROGRESS NOTE - NSBHCHARTREVIEWVS_PSY_A_CORE FT
Vital Signs Last 24 Hrs  T(C): 35.9 (28 May 2021 08:25), Max: 36.7 (27 May 2021 20:50)  T(F): 96.7 (28 May 2021 08:25), Max: 98.1 (27 May 2021 20:50)  HR: --  BP: --  BP(mean): --  RR: --  SpO2: --
Vital Signs Last 24 Hrs  T(C): 36.4 (06 Jun 2021 20:34), Max: 36.9 (06 Jun 2021 08:31)  T(F): 97.6 (06 Jun 2021 20:34), Max: 98.4 (06 Jun 2021 08:31)  HR: 79 (06 Jun 2021 08:31) (79 - 79)  BP: 129/75 (06 Jun 2021 08:31) (129/75 - 129/75)  BP(mean): --  RR: --  SpO2: --
Vital Signs Last 24 Hrs  T(C): 36.7 (15 James 2021 19:27), Max: 36.7 (15 James 2021 19:27)  T(F): 98 (15 James 2021 19:27), Max: 98 (15 James 2021 19:27)  HR: 72 (15 James 2021 06:39) (72 - 72)  BP: 113/61 (15 James 2021 06:39) (113/61 - 113/61)  BP(mean): --  RR: --  SpO2: --
Vital Signs Last 24 Hrs  T(C): 36.6 (14 Jun 2021 06:37), Max: 36.6 (14 Jun 2021 06:37)  T(F): 97.8 (14 Jun 2021 06:37), Max: 97.8 (14 Jun 2021 06:37)  HR: --  BP: --  BP(mean): --  RR: --  SpO2: --
Vital Signs Last 24 Hrs  T(C): 36.6 (24 Jun 2021 20:42), Max: 36.6 (24 Jun 2021 20:42)  T(F): 97.8 (24 Jun 2021 20:42), Max: 97.8 (24 Jun 2021 20:42)  HR: 85 (24 Jun 2021 06:35) (85 - 85)  BP: 125/64 (24 Jun 2021 06:35) (125/64 - 125/64)  BP(mean): --  RR: --  SpO2: --
Vital Signs Last 24 Hrs  T(C): 36.2 (29 May 2021 06:48), Max: 36.6 (28 May 2021 20:30)  T(F): 97.2 (29 May 2021 06:48), Max: 97.8 (28 May 2021 20:30)  HR: --  BP: 123/73 (29 May 2021 06:48) (123/73 - 123/73)  BP(mean): 85 (29 May 2021 06:48) (85 - 85)  RR: --  SpO2: --
Vital Signs Last 24 Hrs  T(C): 36.3 (01 Jun 2021 06:45), Max: 36.7 (31 May 2021 21:50)  T(F): 97.3 (01 Jun 2021 06:45), Max: 98 (31 May 2021 21:50)  HR: --  BP: --  BP(mean): --  RR: --  SpO2: --
Vital Signs Last 24 Hrs  T(C): 36.4 (01 Jul 2021 20:29), Max: 36.4 (01 Jul 2021 06:34)  T(F): 97.6 (01 Jul 2021 20:29), Max: 97.6 (01 Jul 2021 06:34)  HR: 68 (01 Jul 2021 06:34) (68 - 68)  BP: 123/72 (01 Jul 2021 06:34) (123/72 - 123/72)  BP(mean): --  RR: --  SpO2: --
Vital Signs Last 24 Hrs  T(C): 36.3 (11 Jun 2021 06:25), Max: 36.4 (10 James 2021 21:44)  T(F): 97.4 (11 Jun 2021 06:25), Max: 97.5 (10 James 2021 21:44)  HR: 70 (11 Jun 2021 06:25) (70 - 70)  BP: 128/70 (11 Jun 2021 06:25) (128/70 - 128/70)  BP(mean): --  RR: 18 (11 Jun 2021 06:25) (18 - 18)  SpO2: --
Vital Signs Last 24 Hrs  T(C): 35.8 (17 Jun 2021 06:45), Max: 35.8 (17 Jun 2021 06:45)  T(F): 96.5 (17 Jun 2021 06:45), Max: 96.5 (17 Jun 2021 06:45)  HR: --  BP: --  BP(mean): --  RR: --  SpO2: --
Vital Signs Last 24 Hrs  T(C): 36.6 (14 Jun 2021 06:37), Max: 36.6 (14 Jun 2021 06:37)  T(F): 97.8 (14 Jun 2021 06:37), Max: 97.8 (14 Jun 2021 06:37)  HR: --  BP: --  BP(mean): --  RR: --  SpO2: --
Vital Signs Last 24 Hrs  T(C): 36.1 (16 Jun 2021 08:12), Max: 36.1 (16 Jun 2021 08:12)  T(F): 97 (16 Jun 2021 08:12), Max: 97 (16 Jun 2021 08:12)  HR: 79 (16 Jun 2021 08:12) (79 - 79)  BP: 135/74 (16 Jun 2021 08:12) (135/74 - 135/74)  BP(mean): --  RR: --  SpO2: --
Vital Signs Last 24 Hrs  T(C): 37.1 (22 Jun 2021 21:32), Max: 37.1 (22 Jun 2021 21:32)  T(F): 98.8 (22 Jun 2021 21:32), Max: 98.8 (22 Jun 2021 21:32)  HR: 70 (22 Jun 2021 06:16) (70 - 70)  BP: 119/67 (22 Jun 2021 06:16) (119/67 - 119/67)  BP(mean): --  RR: --  SpO2: --
Vital Signs Last 24 Hrs  T(C): 36.2 (07 Jun 2021 06:33), Max: 36.4 (06 Jun 2021 20:34)  T(F): 97.2 (07 Jun 2021 06:33), Max: 97.6 (06 Jun 2021 20:34)  HR: --  BP: 112/68 (07 Jun 2021 06:33) (112/68 - 112/68)  BP(mean): 69 (07 Jun 2021 06:33) (69 - 69)  RR: --  SpO2: --
Vital Signs Last 24 Hrs  T(C): 36.2 (30 May 2021 07:57), Max: 36.3 (29 May 2021 17:26)  T(F): 97.1 (30 May 2021 07:57), Max: 97.4 (29 May 2021 17:26)  HR: 67 (30 May 2021 07:57) (67 - 67)  BP: 129/98 (30 May 2021 07:57) (129/98 - 129/98)  BP(mean): --  RR: --  SpO2: --
Vital Signs Last 24 Hrs  T(C): 36.3 (02 Jun 2021 06:42), Max: 36.3 (01 Jun 2021 20:50)  T(F): 97.4 (02 Jun 2021 06:42), Max: 97.4 (02 Jun 2021 06:42)  HR: --  BP: 125/68 (02 Jun 2021 06:42) (125/68 - 125/68)  BP(mean): 87 (02 Jun 2021 06:42) (87 - 87)  RR: 24 (02 Jun 2021 06:42) (24 - 24)  SpO2: --
Vital Signs Last 24 Hrs  T(C): 36.3 (02 Jun 2021 21:46), Max: 36.3 (02 Jun 2021 06:42)  T(F): 97.4 (02 Jun 2021 21:46), Max: 97.4 (02 Jun 2021 06:42)  HR: --  BP: 125/68 (02 Jun 2021 06:42) (125/68 - 125/68)  BP(mean): 87 (02 Jun 2021 06:42) (87 - 87)  RR: 24 (02 Jun 2021 06:42) (24 - 24)  SpO2: --
Vital Signs Last 24 Hrs  T(C): 36.2 (18 Jun 2021 08:44), Max: 36.3 (17 Jun 2021 20:48)  T(F): 97.1 (18 Jun 2021 08:44), Max: 97.3 (17 Jun 2021 20:48)  HR: 75 (18 Jun 2021 08:44) (75 - 75)  BP: 138/77 (18 Jun 2021 08:44) (138/77 - 138/77)  BP(mean): --  RR: --  SpO2: --
Vital Signs Last 24 Hrs  T(C): 36.7 (27 May 2021 20:50), Max: 36.7 (27 May 2021 20:50)  T(F): 98.1 (27 May 2021 20:50), Max: 98.1 (27 May 2021 20:50)  HR: --  BP: --  BP(mean): --  RR: --  SpO2: --
Vital Signs Last 24 Hrs  T(C): 36.3 (25 Jun 2021 08:30), Max: 36.6 (24 Jun 2021 20:42)  T(F): 97.4 (25 Jun 2021 08:30), Max: 97.8 (24 Jun 2021 20:42)  HR: --  BP: --  BP(mean): --  RR: --  SpO2: --
Vital Signs Last 24 Hrs  T(C): 36.4 (03 Jun 2021 20:50), Max: 36.4 (03 Jun 2021 20:50)  T(F): 97.6 (03 Jun 2021 20:50), Max: 97.6 (03 Jun 2021 20:50)  HR: 78 (03 Jun 2021 07:58) (78 - 78)  BP: 112/71 (03 Jun 2021 07:58) (112/71 - 112/71)  BP(mean): --  RR: --  SpO2: --
Vital Signs Last 24 Hrs  T(C): --  T(F): --  HR: --  BP: --  BP(mean): --  RR: --  SpO2: --
Vital Signs Last 24 Hrs  T(C): 36.6 (09 Jun 2021 20:36), Max: 36.6 (09 Jun 2021 20:36)  T(F): 97.9 (09 Jun 2021 20:36), Max: 97.9 (09 Jun 2021 20:36)  HR: --  BP: 118/58 (09 Jun 2021 06:39) (118/58 - 118/58)  BP(mean): --  RR: --  SpO2: --
Vital Signs Last 24 Hrs  T(C): 36.3 (29 Jun 2021 08:04), Max: 36.3 (29 Jun 2021 08:04)  T(F): 97.4 (29 Jun 2021 08:04), Max: 97.4 (29 Jun 2021 08:04)  HR: 77 (29 Jun 2021 08:04) (77 - 77)  BP: 127/64 (29 Jun 2021 08:04) (127/64 - 127/64)  BP(mean): --  RR: --  SpO2: --
Vital Signs Last 24 Hrs  T(C): 36.6 (08 Jun 2021 07:55), Max: 36.6 (08 Jun 2021 07:55)  T(F): 97.8 (08 Jun 2021 07:55), Max: 97.8 (08 Jun 2021 07:55)  HR: --  BP: --  BP(mean): --  RR: --  SpO2: --
Vital Signs Last 24 Hrs  T(C): 36.3 (29 Jun 2021 08:04), Max: 36.3 (29 Jun 2021 08:04)  T(F): 97.4 (29 Jun 2021 08:04), Max: 97.4 (29 Jun 2021 08:04)  HR: 77 (29 Jun 2021 08:04) (77 - 77)  BP: 127/64 (29 Jun 2021 08:04) (127/64 - 127/64)  BP(mean): --  RR: --  SpO2: --
Vital Signs Last 24 Hrs  T(C): 36.4 (02 Jul 2021 06:31), Max: 36.4 (01 Jul 2021 20:29)  T(F): 97.5 (02 Jul 2021 06:31), Max: 97.6 (01 Jul 2021 20:29)  HR: --  BP: --  BP(mean): --  RR: --  SpO2: --
Vital Signs Last 24 Hrs  T(C): 36.2 (23 Jun 2021 20:00), Max: 36.2 (23 Jun 2021 06:39)  T(F): 97.2 (23 Jun 2021 20:00), Max: 97.2 (23 Jun 2021 20:00)  HR: 78 (23 Jun 2021 06:39) (78 - 78)  BP: 131/71 (23 Jun 2021 06:39) (131/71 - 131/71)  BP(mean): --  RR: --  SpO2: --
Vital Signs Last 24 Hrs  T(C): 36.3 (21 Jun 2021 20:56), Max: 36.5 (21 Jun 2021 07:56)  T(F): 97.4 (21 Jun 2021 20:56), Max: 97.7 (21 Jun 2021 07:56)  HR: 84 (21 Jun 2021 07:56) (84 - 84)  BP: 128/73 (21 Jun 2021 07:56) (128/73 - 128/73)  BP(mean): --  RR: --  SpO2: --
Vital Signs Last 24 Hrs  T(C): 36.3 (30 Jun 2021 08:12), Max: 36.3 (30 Jun 2021 08:12)  T(F): 97.3 (30 Jun 2021 08:12), Max: 97.3 (30 Jun 2021 08:12)  HR: --  BP: --  BP(mean): --  RR: --  SpO2: --

## 2021-07-02 NOTE — BH INPATIENT PSYCHIATRY PROGRESS NOTE - NSBHMSEKNOWHOW_PSY_ALL_CORE
Current Events/Educational attainment/Vocabulary/Other...

## 2021-07-02 NOTE — BH INPATIENT PSYCHIATRY PROGRESS NOTE - NSTXDCOPNOGOAL_PSY_ALL_CORE
Will agree to participate in appropriate outpatient care

## 2021-07-02 NOTE — BH INPATIENT PSYCHIATRY PROGRESS NOTE - NSTXDEPRESGOAL_PSY_ALL_CORE
Will identify thoughts and self-talk that contribute to depression
Exhibit improvements in self-grooming, hygiene, sleep and appetite
Will identify thoughts and self-talk that contribute to depression
Exhibit improvements in self-grooming, hygiene, sleep and appetite
Will identify thoughts and self-talk that contribute to depression
Exhibit improvements in self-grooming, hygiene, sleep and appetite
Will identify thoughts and self-talk that contribute to depression
Exhibit improvements in self-grooming, hygiene, sleep and appetite

## 2021-07-02 NOTE — BH INPATIENT PSYCHIATRY PROGRESS NOTE - NSBHMSERELATED_PSY_A_CORE
Other
Good
Other
Other
Poor
Other
Good
Other

## 2021-07-02 NOTE — BH INPATIENT PSYCHIATRY PROGRESS NOTE - NSTXSUBMISGOAL_PSY_ALL_CORE
Be able to acknowledge that substance abuse is a problem

## 2021-07-02 NOTE — BH INPATIENT PSYCHIATRY PROGRESS NOTE - NSTXDCFAMDATETRGT_PSY_ALL_CORE
08-Jun-2021

## 2021-07-02 NOTE — BH INPATIENT PSYCHIATRY PROGRESS NOTE - NSTXPROBMEDIC_PSY_ALL_CORE
MEDICATION/TREATMENT NON-COMPLIANCE

## 2021-07-02 NOTE — BH DISCHARGE NOTE NURSING/SOCIAL WORK/PSYCH REHAB - DISCHARGE INSTRUCTIONS AFTERCARE APPOINTMENTS
In order to check the location, date, or time of your aftercare appointment, please refer to your Discharge Instructions Document given to you upon leaving the hospital.  If you have lost the instructions please call [phone]

## 2021-07-02 NOTE — BH INPATIENT PSYCHIATRY PROGRESS NOTE - NSTXMEDICDATENEW_PSY_ALL_CORE
25-May-2021

## 2021-07-02 NOTE — BH INPATIENT PSYCHIATRY PROGRESS NOTE - NSICDXBHPRIMARYDX_PSY_ALL_CORE
Bipolar I disorder, mild, current or most recent episode manic, with psychotic features, with mood-congruent psychotic features   F31.2  
Psychosis not due to substance or known physiological condition   F29  
Bipolar I disorder, mild, current or most recent episode manic, with psychotic features, with mood-congruent psychotic features   F31.2  
Psychosis not due to substance or known physiological condition   F29  
Bipolar I disorder, mild, current or most recent episode manic, with psychotic features, with mood-congruent psychotic features   F31.2  
Psychosis not due to substance or known physiological condition   F29  
Psychosis not due to substance or known physiological condition   F29  
Bipolar I disorder, mild, current or most recent episode manic, with psychotic features, with mood-congruent psychotic features   F31.2  
Psychosis not due to substance or known physiological condition   F29  
Bipolar I disorder, mild, current or most recent episode manic, with psychotic features, with mood-congruent psychotic features   F31.2  
Psychosis not due to substance or known physiological condition   F29  
Bipolar I disorder, mild, current or most recent episode manic, with psychotic features, with mood-congruent psychotic features   F31.2  
Psychosis not due to substance or known physiological condition   F29  
Bipolar I disorder, mild, current or most recent episode manic, with psychotic features, with mood-congruent psychotic features   F31.2  

## 2021-07-02 NOTE — BH INPATIENT PSYCHIATRY DISCHARGE NOTE - NSBHDCSIGEVENTSFT_PSY_A_CORE
Continued from above:    DISCHARGE PLAN  1)	Pt stable for DC to home with Rx meds via [facility];  2)	Psychopharm, as below:    All Active Home Medications at time of Discharge Reconciliation: 02-Jul-2021 10:43    Aristada 882 mg/3.2 mL intramuscular suspension, extended release 882 milligram(s) intramuscularly every 4 weeks; given 6/30/21 with INITIO; NEXT DUE: 7/28/21      Multiple Vitamins with Minerals oral tablet 1 tab(s) orally once a day (at bedtime)      3) Long Acting Injectible medication (UMAÑA): Aristada 882 mg/3.2 mL intramuscular suspension, extended release 882 milligram(s) intramuscularly every 4 weeks; given 6/30/21 with INITIO;  NEXT DUE: 7/28/21  PO bridging: none needed; Aristada INITIO UMAÑA given 675 mg IM x1 prior to Aristada 882 mg UMAÑA, above.  4) Psychoeducation still needed on dx and tx plan, as pt remains oppositional to tx.  5) Suggest parenting courses or group to help pt with skills.  6) Labs:  Basic labs as per SHANIKA Sorenson;  utox not done; HDs=614;  metabolic monitoring while on abilify and/or UMAÑA.  Covid NEG.  R hand xray: no fractures  7) Pt would benefit from referral for psychotherapy including socialization group.  8) NUT follow up while on SGA/UMAÑA and encourage exercise.  9) Identify important supports (pt resides along, gave no consents)- encourage family meeting and education of supports.  10) Consider PROS type program referral? Pt may benefit from some form of vocational referral such as [employer] or similar.   11) PCP f/u with MD warnern for medical comorbids/metabolic monitoring.  12) Sleep: no additional sleep medication.  13) Substance use: none known  14) PAIN mgmt.: none  15) Smoking: none  16) Pt may need help with legal consult for CPS issues.    Additional discharge-related matters:  All Rx meds given for 30 days, no refills- unless otherwise indicated.  Controlled substances given for 30 days MAX, unless otherwise indicated.  DUE DATES for all [facility] indicated in DISCHARGE PLAN section. Any questions about UMAÑA/proposed future due dates, please call attending, as below.  Writer and inpatient team can be reached at [facility] M-F at: [phone] (can request MD and/or leave message with [facility] receptionist).

## 2021-07-02 NOTE — BH INPATIENT PSYCHIATRY PROGRESS NOTE - NSTXPROBSUBMIS_PSY_ALL_CORE
SUBSTANCE MISUSE

## 2021-07-02 NOTE — BH INPATIENT PSYCHIATRY PROGRESS NOTE - NSBHMSEBEHAV_PSY_A_CORE
Uncooperative/Other
Cooperative/Other
Cooperative/Other
Cooperative/Uncooperative/Other
Uncooperative
Cooperative/Uncooperative/Other
Uncooperative
Uncooperative/Other
Cooperative/Uncooperative/Other
Uncooperative
Cooperative/Other
Uncooperative/Other
Cooperative/Other
Cooperative/Uncooperative/Other
Cooperative/Other
Uncooperative
Cooperative/Uncooperative/Other
Cooperative/Other
Cooperative/Uncooperative/Other
Uncooperative/Other
Uncooperative
Cooperative/Other
Uncooperative/Other
Cooperative/Other

## 2021-07-02 NOTE — BH INPATIENT PSYCHIATRY PROGRESS NOTE - NSTXDCFAMDATEEST_PSY_ALL_CORE
01-Jun-2021

## 2021-07-02 NOTE — BH INPATIENT PSYCHIATRY PROGRESS NOTE - NSBHMSESPABN_PSY_A_CORE
Loud volume/Pressured rate/Increased productivity
Loud volume/Pressured rate/Increased productivity
Loud volume/Pressured rate/Increased productivity/Other
Loud volume/Pressured rate/Increased productivity
Loud volume/Pressured rate/Increased productivity/Other
Loud volume/Pressured rate/Increased productivity
Loud volume/Pressured rate/Increased productivity
Loud volume/Pressured rate/Increased productivity/Other
Loud volume/Pressured rate/Increased productivity
Loud volume/Pressured rate/Increased productivity/Other
Loud volume/Pressured rate/Increased productivity
Loud volume/Pressured rate/Increased productivity/Other

## 2021-07-02 NOTE — BH INPATIENT PSYCHIATRY PROGRESS NOTE - NSTXANXPROGRES_PSY_ALL_CORE
Improving
No Change
Improving

## 2021-07-02 NOTE — BH INPATIENT PSYCHIATRY PROGRESS NOTE - NSTXDCOPNOPROGRES_PSY_ALL_CORE
No Change

## 2021-07-02 NOTE — BH INPATIENT PSYCHIATRY DISCHARGE NOTE - NSDCMRMEDTOKEN_GEN_ALL_CORE_FT
Aristada 882 mg/3.2 mL intramuscular suspension, extended release: 882 milligram(s) intramuscularly every 4 weeks; given 6/30/21 with INITIO; NEXT DUE: 7/28/21   Multiple Vitamins with Minerals oral tablet: 1 tab(s) orally once a day (at bedtime)

## 2021-07-02 NOTE — BH INPATIENT PSYCHIATRY PROGRESS NOTE - NSTXDEPRESPROGRES_PSY_ALL_CORE
No Change
Improving
No Change
Improving
No Change
Improving
Improving
No Change
Improving
No Change
Improving
No Change
Improving
No Change
Improving

## 2021-07-02 NOTE — BH INPATIENT PSYCHIATRY PROGRESS NOTE - NSTXPSYCHODATETRGT_PSY_ALL_CORE
09-Jun-2021
23-Jun-2021
09-Jun-2021
23-Jun-2021
09-Jun-2021
09-Jun-2021
23-Jun-2021
23-Jun-2021
09-Jun-2021
09-Jun-2021
23-Jun-2021
23-Jun-2021
09-Jun-2021
23-Jun-2021
23-Jun-2021

## 2021-07-02 NOTE — BH INPATIENT PSYCHIATRY PROGRESS NOTE - NSTXVIOLNTINTERMD_PSY_ALL_CORE
Psychopharm with goal of UMAÑA and supportive therapy now with RETENTION+MOO
Psychopharm with goal of UMAÑA and supportive therapy
Psychopharm with goal of UMAÑA and supportive therapy now with RETENTION+MOO
Psychopharm with goal of UMAÑA and supportive therapy
Psychopharm with goal of UMAÑA and supportive therapy now with RETENTION+MOO
Psychopharm with goal of UMAÑA and supportive therapy now with RETENTION+MOO

## 2021-07-02 NOTE — BH INPATIENT PSYCHIATRY DISCHARGE NOTE - NSTOBACCOUSAGEY/N_GEN_A_CS
Barry Solano  7/11/1930  [id number]    Situation:  Verbal report received from: Amaya Felix  Procedure: Procedure(s):  ESOPHAGOGASTRODUODENOSCOPY (EGD)  ESOPHAGOGASTRODUODENAL (EGD) BIOPSY  ENDOSCOPIC ARGON PLASMA COAGULATION    Background:    Preoperative diagnosis: GI bleed  Postoperative diagnosis: 1. - AVM duodenum   2. Erosive gastritis   3. Hiatial hernia  4esophagitis    :  Dr. Pauleen Boast  Assistant(s): * No surgical staff found *    Specimens:   ID Type Source Tests Collected by Time Destination   1 : Gaastric Preservative Gastric  Ha Hobbs MD 12/2/2019 1644 Pathology   2 : lower esophagus Preservative Esophagus, Distal  Ha Hobbs MD 12/2/2019 1654 Pathology     H. Pylori  no    Assessment:  Intra-procedure medications     Anesthesia gave intra-procedure sedation and medications, see anesthesia flow sheet yes    Intravenous fluids: NS@ KVO     Vital signs stable yes    Abdominal assessment: round and soft  yes    Recommendation:  Discharge patient per MD Jeremi Hernandez  Return to floor yes  Family or Friend fam  Permission to share finding with family or friend yes No

## 2021-07-02 NOTE — BH INPATIENT PSYCHIATRY PROGRESS NOTE - NSTXCOPEGOAL_PSY_ALL_CORE
Identify and utilize 2 coping skills that meet their needs

## 2021-07-02 NOTE — BH INPATIENT PSYCHIATRY PROGRESS NOTE - NSDCCRITERIA_PSY_ALL_CORE
cgi<=2

## 2021-07-02 NOTE — BH INPATIENT PSYCHIATRY PROGRESS NOTE - NSTXCOPEDATEEST_PSY_ALL_CORE
02-Jun-2021
02-Jun-2021
16-Jun-2021
02-Jun-2021
02-Jun-2021
16-Jun-2021
02-Jun-2021
16-Jun-2021
02-Jun-2021
02-Jun-2021
16-Jun-2021
02-Jun-2021
16-Jun-2021
02-Jun-2021
02-Jun-2021
16-Jun-2021
16-Jun-2021

## 2021-07-02 NOTE — BH INPATIENT PSYCHIATRY PROGRESS NOTE - NSTXPSYCHOPROGRES_PSY_ALL_CORE
Improving
No Change
Improving

## 2021-07-02 NOTE — BH INPATIENT PSYCHIATRY DISCHARGE NOTE - OTHER PAST PSYCHIATRIC HISTORY (INCLUDE DETAILS REGARDING ONSET, COURSE OF ILLNESS, INPATIENT/OUTPATIENT TREATMENT)
As per ED note: pt is a 32 yo single, domiciled, unemployed AA male with reported PPHx of depression previously on Zoloft (while incarcerated years ago), no prior inpatient or outpatient treatment, no prior SIB or suicide attempts, not currently in treatment, no significant PMH, history of remote cannabis abuse, legal history of incarceration for assault, attempted robbery and criminal contempt of court, brought in by EMS for agitation in the home.    When speaking to patient he is quite hyperverbal and pressured. Interview is limited as he interrupts often and becomes tangential with loose associations. He is perseverative about a dispute he had with his landlord, though it is difficult to understand the events leading to admission as he is a poor historian. 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 He is resistant to discuss medication and treatment he will receive while inpatient. poor insight

## 2021-07-02 NOTE — BH INPATIENT PSYCHIATRY PROGRESS NOTE - NSTXIMPULSDATETRGT_PSY_ALL_CORE
02-Jun-2021
02-Jun-2021
23-Jun-2021
02-Jun-2021
23-Jun-2021
02-Jun-2021
02-Jun-2021
23-Jun-2021
02-Jun-2021
23-Jun-2021
23-Jun-2021
02-Jun-2021
02-Jun-2021
23-Jun-2021
02-Jun-2021
02-Jun-2021
23-Jun-2021
02-Jun-2021
23-Jun-2021
23-Jun-2021
02-Jun-2021
23-Jun-2021
02-Jun-2021
23-Jun-2021
02-Jun-2021

## 2021-07-02 NOTE — BH INPATIENT PSYCHIATRY DISCHARGE NOTE - NSBHDCHANDOFFFT_PSY_ALL_CORE
Writer will signout case by phone [facility] [phone] but better [phone]; LM with writer’s contact info.  Called back 8/11/21 to confirm DC summary received; LM with writer’s contact info.  out case by phone as CC not returning calls.

## 2021-07-02 NOTE — BH INPATIENT PSYCHIATRY PROGRESS NOTE - NSTXPSYCHOGOAL_PSY_ALL_CORE
State that he/she is only about 50% sure of the certainty of the delusions instead of 100% certain
Will be able to report experiencing hallucinations to staff
State that he/she is only about 50% sure of the certainty of the delusions instead of 100% certain
State that he/she is only about 50% sure of the certainty of the delusions instead of 100% certain
Will be able to report experiencing hallucinations to staff
State that he/she is only about 50% sure of the certainty of the delusions instead of 100% certain
Will be able to report experiencing hallucinations to staff
Will be able to report experiencing hallucinations to staff
State that he/she is only about 50% sure of the certainty of the delusions instead of 100% certain
Will be able to report experiencing hallucinations to staff
State that he/she is only about 50% sure of the certainty of the delusions instead of 100% certain
State that he/she is only about 50% sure of the certainty of the delusions instead of 100% certain
Will be able to report experiencing hallucinations to staff
Will be able to report experiencing hallucinations to staff
State that he/she is only about 50% sure of the certainty of the delusions instead of 100% certain
Will be able to report experiencing hallucinations to staff
State that he/she is only about 50% sure of the certainty of the delusions instead of 100% certain
Will be able to report experiencing hallucinations to staff

## 2021-07-02 NOTE — BH INPATIENT PSYCHIATRY PROGRESS NOTE - NSTXPROBDEPRES_PSY_ALL_CORE
DEPRESSIVE SYMPTOMS

## 2021-07-02 NOTE — BH INPATIENT PSYCHIATRY PROGRESS NOTE - NSTXMANICDATEEST_PSY_ALL_CORE
16-Jun-2021
02-Jun-2021
16-Jun-2021
02-Jun-2021
16-Jun-2021
02-Jun-2021
16-Jun-2021
02-Jun-2021
16-Jun-2021
16-Jun-2021

## 2021-07-02 NOTE — BH INPATIENT PSYCHIATRY PROGRESS NOTE - NS ED B[facility] MED ROS PSYCHIATRIC
See HPI

## 2021-07-02 NOTE — BH INPATIENT PSYCHIATRY PROGRESS NOTE - NSTXDCOPLKDATEEST_PSY_ALL_CORE
25-May-2021
18-Jun-2021
25-May-2021
25-Jun-2021
25-May-2021
25-Jun-2021
09-Jun-2021
09-Jun-2021
25-Jun-2021
09-Jun-2021
25-May-2021
25-May-2021
09-Jun-2021
09-Jun-2021
25-Jun-2021
25-May-2021
18-Jun-2021
18-Jun-2021
25-May-2021
18-Jun-2021
09-Jun-2021
25-Jun-2021
09-Jun-2021
25-May-2021
25-May-2021
25-Jun-2021
18-Jun-2021

## 2021-07-02 NOTE — BH INPATIENT PSYCHIATRY PROGRESS NOTE - NSBHCONSULTIPREASON_PSY_A_CORE
danger to others; mental illness expected to respond to inpatient care

## 2021-07-02 NOTE — BH INPATIENT PSYCHIATRY PROGRESS NOTE - NSTXANXINTERMD_PSY_ALL_CORE
Psychopharm with goal of UMAÑA and supportive therapy now with RETENTION+MOO
Psychopharm with goal of UMAÑA and supportive therapy
Psychopharm with goal of UMAÑA and supportive therapy now with RETENTION+MOO
Psychopharm with goal of UMAÑA and supportive therapy

## 2021-07-02 NOTE — BH INPATIENT PSYCHIATRY PROGRESS NOTE - NSBHMSEJUDGE_PSY_A_CORE
Poor
Poor
Fair
Poor
Fair
Poor
Fair
Poor
Poor

## 2021-07-02 NOTE — BH INPATIENT PSYCHIATRY PROGRESS NOTE - NSTXPROBDCFAM_PSY_ALL_CORE
DISCHARGE ISSUE - POOR ENGAGEMENT WITH SUPPORTS/FAMILY

## 2021-07-02 NOTE — BH INPATIENT PSYCHIATRY PROGRESS NOTE - NSTXIMPULSDATEEST_PSY_ALL_CORE
26-May-2021
16-Jun-2021
26-May-2021
16-Jun-2021
26-May-2021
16-Jun-2021
26-May-2021
16-Jun-2021
26-May-2021
16-Jun-2021
16-Jun-2021
26-May-2021
26-May-2021
16-Jun-2021

## 2021-07-02 NOTE — BH INPATIENT PSYCHIATRY PROGRESS NOTE - NSTXSUBMISINTERMD_PSY_ALL_CORE
Psychopharm with goal of UMAÑA and supportive therapy

## 2021-07-02 NOTE — BH INPATIENT PSYCHIATRY PROGRESS NOTE - NSTXPROBCOPE_PSY_ALL_CORE
COPING, INEFFECTIVE

## 2021-07-02 NOTE — BH INPATIENT PSYCHIATRY PROGRESS NOTE - NSTXDCOPLKPROGRES_PSY_ALL_CORE
No Change
Improving
No Change
No Change
Improving
No Change
Improving
No Change
Improving
No Change
Improving
No Change
Improving

## 2021-07-02 NOTE — BH INPATIENT PSYCHIATRY PROGRESS NOTE - NSBHMETABOLIC_PSY_ALL_CORE_FT
BMI: BMI (kg/m2): 24.1 (06-02-21 @ 08:30)  HbA1c: A1C with Estimated Average Glucose Result: 5.3 % (05-25-21 @ 10:42)    Glucose:   BP: 135/74 (06-16-21 @ 08:12) (113/61 - 135/74)  Lipid Panel: Date/Time: 05-25-21 @ 10:42  Cholesterol, Serum: 162  Direct LDL: --  HDL Cholesterol, Serum: 70  Total Cholesterol/HDL Ration Measurement: --  Triglycerides, Serum: 57  
BMI:   HbA1c: A1C with Estimated Average Glucose Result: 5.3 % (05-25-21 @ 10:42)    Glucose:   BP: 123/73 (05-29-21 @ 06:48) (123/73 - 123/73)  Lipid Panel: Date/Time: 05-25-21 @ 10:42  Cholesterol, Serum: 162  Direct LDL: --  HDL Cholesterol, Serum: 70  Total Cholesterol/HDL Ration Measurement: --  Triglycerides, Serum: 57  
BMI: BMI (kg/m2): 24.1 (06-02-21 @ 08:30)  HbA1c: A1C with Estimated Average Glucose Result: 5.3 % (05-25-21 @ 10:42)    Glucose:   BP: 125/68 (06-02-21 @ 06:42) (125/68 - 125/68)  Lipid Panel: Date/Time: 05-25-21 @ 10:42  Cholesterol, Serum: 162  Direct LDL: --  HDL Cholesterol, Serum: 70  Total Cholesterol/HDL Ration Measurement: --  Triglycerides, Serum: 57  
BMI: BMI (kg/m2): 24.1 (06-02-21 @ 08:30)  HbA1c: A1C with Estimated Average Glucose Result: 5.3 % (05-25-21 @ 10:42)    Glucose:   BP: 128/73 (06-21-21 @ 07:56) (111/62 - 128/73)  Lipid Panel: Date/Time: 05-25-21 @ 10:42  Cholesterol, Serum: 162  Direct LDL: --  HDL Cholesterol, Serum: 70  Total Cholesterol/HDL Ration Measurement: --  Triglycerides, Serum: 57  
BMI: BMI (kg/m2): 24.1 (06-02-21 @ 08:30)  HbA1c: A1C with Estimated Average Glucose Result: 5.3 % (05-25-21 @ 10:42)    Glucose:   BP: 131/71 (06-23-21 @ 06:39) (119/67 - 131/71)  Lipid Panel: Date/Time: 05-25-21 @ 10:42  Cholesterol, Serum: 162  Direct LDL: --  HDL Cholesterol, Serum: 70  Total Cholesterol/HDL Ration Measurement: --  Triglycerides, Serum: 57  
BMI: BMI (kg/m2): 24.1 (06-02-21 @ 08:30)  HbA1c: A1C with Estimated Average Glucose Result: 5.3 % (05-25-21 @ 10:42)    Glucose:   BP: 112/68 (06-07-21 @ 06:33) (112/68 - 129/75)  Lipid Panel: Date/Time: 05-25-21 @ 10:42  Cholesterol, Serum: 162  Direct LDL: --  HDL Cholesterol, Serum: 70  Total Cholesterol/HDL Ration Measurement: --  Triglycerides, Serum: 57  
BMI: BMI (kg/m2): 24.1 (06-02-21 @ 08:30)  HbA1c: A1C with Estimated Average Glucose Result: 5.3 % (05-25-21 @ 10:42)    Glucose:   BP: 11/68 (06-13-21 @ 06:17) (11/68 - 115/72)  Lipid Panel: Date/Time: 05-25-21 @ 10:42  Cholesterol, Serum: 162  Direct LDL: --  HDL Cholesterol, Serum: 70  Total Cholesterol/HDL Ration Measurement: --  Triglycerides, Serum: 57  
BMI: BMI (kg/m2): 24.1 (06-02-21 @ 08:30)  HbA1c: A1C with Estimated Average Glucose Result: 5.3 % (05-25-21 @ 10:42)    Glucose:   BP: 119/67 (06-22-21 @ 06:16) (119/67 - 128/73)  Lipid Panel: Date/Time: 05-25-21 @ 10:42  Cholesterol, Serum: 162  Direct LDL: --  HDL Cholesterol, Serum: 70  Total Cholesterol/HDL Ration Measurement: --  Triglycerides, Serum: 57  
BMI:   HbA1c: A1C with Estimated Average Glucose Result: 5.3 % (05-25-21 @ 10:42)    Glucose:   BP: 120/68 (05-26-21 @ 06:50) (120/68 - 120/68)  Lipid Panel: Date/Time: 05-25-21 @ 10:42  Cholesterol, Serum: 162  Direct LDL: --  HDL Cholesterol, Serum: 70  Total Cholesterol/HDL Ration Measurement: --  Triglycerides, Serum: 57  
BMI: BMI (kg/m2): 24.1 (06-02-21 @ 08:30)  HbA1c: A1C with Estimated Average Glucose Result: 5.3 % (05-25-21 @ 10:42)    Glucose:   BP: 123/72 (07-01-21 @ 06:34) (123/72 - 127/64)  Lipid Panel: Date/Time: 05-25-21 @ 10:42  Cholesterol, Serum: 162  Direct LDL: --  HDL Cholesterol, Serum: 70  Total Cholesterol/HDL Ration Measurement: --  Triglycerides, Serum: 57  
BMI: BMI (kg/m2): 24.1 (06-02-21 @ 08:30)  HbA1c: A1C with Estimated Average Glucose Result: 5.3 % (05-25-21 @ 10:42)    Glucose:   BP: 127/64 (06-29-21 @ 08:04) (125/66 - 127/64)  Lipid Panel: Date/Time: 05-25-21 @ 10:42  Cholesterol, Serum: 162  Direct LDL: --  HDL Cholesterol, Serum: 70  Total Cholesterol/HDL Ration Measurement: --  Triglycerides, Serum: 57  
BMI: BMI (kg/m2): 24.1 (06-02-21 @ 08:30)  HbA1c: A1C with Estimated Average Glucose Result: 5.3 % (05-25-21 @ 10:42)    Glucose:   BP: 125/64 (06-24-21 @ 06:35) (125/64 - 131/71)  Lipid Panel: Date/Time: 05-25-21 @ 10:42  Cholesterol, Serum: 162  Direct LDL: --  HDL Cholesterol, Serum: 70  Total Cholesterol/HDL Ration Measurement: --  Triglycerides, Serum: 57  
BMI:   HbA1c: A1C with Estimated Average Glucose Result: 5.3 % (05-25-21 @ 10:42)    Glucose:   BP: 129/98 (05-30-21 @ 07:57) (129/98 - 129/98)  Lipid Panel: Date/Time: 05-25-21 @ 10:42  Cholesterol, Serum: 162  Direct LDL: --  HDL Cholesterol, Serum: 70  Total Cholesterol/HDL Ration Measurement: --  Triglycerides, Serum: 57  
BMI: BMI (kg/m2): 24.1 (06-02-21 @ 08:30)  HbA1c: A1C with Estimated Average Glucose Result: 5.3 % (05-25-21 @ 10:42)    Glucose:   BP: 112/68 (06-07-21 @ 06:33) (112/68 - 129/75)  Lipid Panel: Date/Time: 05-25-21 @ 10:42  Cholesterol, Serum: 162  Direct LDL: --  HDL Cholesterol, Serum: 70  Total Cholesterol/HDL Ration Measurement: --  Triglycerides, Serum: 57  
BMI: BMI (kg/m2): 24.1 (06-02-21 @ 08:30)  HbA1c: A1C with Estimated Average Glucose Result: 5.3 % (05-25-21 @ 10:42)    Glucose:   BP: 138/77 (06-18-21 @ 08:44) (135/74 - 138/77)  Lipid Panel: Date/Time: 05-25-21 @ 10:42  Cholesterol, Serum: 162  Direct LDL: --  HDL Cholesterol, Serum: 70  Total Cholesterol/HDL Ration Measurement: --  Triglycerides, Serum: 57  
BMI:   HbA1c: A1C with Estimated Average Glucose Result: 5.3 % (05-25-21 @ 10:42)    Glucose:   BP: 120/68 (05-26-21 @ 06:50) (120/68 - 120/68)  Lipid Panel: Date/Time: 05-25-21 @ 10:42  Cholesterol, Serum: 162  Direct LDL: --  HDL Cholesterol, Serum: 70  Total Cholesterol/HDL Ration Measurement: --  Triglycerides, Serum: 57  
BMI: BMI (kg/m2): 24.1 (06-02-21 @ 08:30)  HbA1c: A1C with Estimated Average Glucose Result: 5.3 % (05-25-21 @ 10:42)    Glucose:   BP: 127/64 (06-29-21 @ 08:04) (125/66 - 127/64)  Lipid Panel: Date/Time: 05-25-21 @ 10:42  Cholesterol, Serum: 162  Direct LDL: --  HDL Cholesterol, Serum: 70  Total Cholesterol/HDL Ration Measurement: --  Triglycerides, Serum: 57  
BMI: BMI (kg/m2): 24.1 (06-02-21 @ 08:30)  HbA1c: A1C with Estimated Average Glucose Result: 5.3 % (05-25-21 @ 10:42)    Glucose:   BP: 11/68 (06-13-21 @ 06:17) (11/68 - 115/72)  Lipid Panel: Date/Time: 05-25-21 @ 10:42  Cholesterol, Serum: 162  Direct LDL: --  HDL Cholesterol, Serum: 70  Total Cholesterol/HDL Ration Measurement: --  Triglycerides, Serum: 57  
BMI:   HbA1c: A1C with Estimated Average Glucose Result: 5.3 % (05-25-21 @ 10:42)    Glucose:   BP: 129/98 (05-30-21 @ 07:57) (123/73 - 129/98)  Lipid Panel: Date/Time: 05-25-21 @ 10:42  Cholesterol, Serum: 162  Direct LDL: --  HDL Cholesterol, Serum: 70  Total Cholesterol/HDL Ration Measurement: --  Triglycerides, Serum: 57  
BMI:   HbA1c: A1C with Estimated Average Glucose Result: 5.3 % (05-25-21 @ 10:42)    Glucose:   BP: 129/98 (05-30-21 @ 07:57) (123/73 - 129/98)  Lipid Panel: Date/Time: 05-25-21 @ 10:42  Cholesterol, Serum: 162  Direct LDL: --  HDL Cholesterol, Serum: 70  Total Cholesterol/HDL Ration Measurement: --  Triglycerides, Serum: 57  
BMI: BMI (kg/m2): 24.1 (06-02-21 @ 08:30)  HbA1c: A1C with Estimated Average Glucose Result: 5.3 % (05-25-21 @ 10:42)    Glucose:   BP: 125/64 (06-24-21 @ 06:35) (119/67 - 131/71)  Lipid Panel: Date/Time: 05-25-21 @ 10:42  Cholesterol, Serum: 162  Direct LDL: --  HDL Cholesterol, Serum: 70  Total Cholesterol/HDL Ration Measurement: --  Triglycerides, Serum: 57  
BMI: BMI (kg/m2): 24.1 (06-02-21 @ 08:30)  HbA1c: A1C with Estimated Average Glucose Result: 5.3 % (05-25-21 @ 10:42)    Glucose:   BP: 118/58 (06-09-21 @ 06:39) (112/68 - 118/58)  Lipid Panel: Date/Time: 05-25-21 @ 10:42  Cholesterol, Serum: 162  Direct LDL: --  HDL Cholesterol, Serum: 70  Total Cholesterol/HDL Ration Measurement: --  Triglycerides, Serum: 57  
BMI: BMI (kg/m2): 24.1 (06-02-21 @ 08:30)  HbA1c: A1C with Estimated Average Glucose Result: 5.3 % (05-25-21 @ 10:42)    Glucose:   BP: 127/64 (06-29-21 @ 08:04) (125/66 - 127/64)  Lipid Panel: Date/Time: 05-25-21 @ 10:42  Cholesterol, Serum: 162  Direct LDL: --  HDL Cholesterol, Serum: 70  Total Cholesterol/HDL Ration Measurement: --  Triglycerides, Serum: 57  
BMI: BMI (kg/m2): 24.1 (06-02-21 @ 08:30)  HbA1c: A1C with Estimated Average Glucose Result: 5.3 % (05-25-21 @ 10:42)    Glucose:   BP: 129/75 (06-06-21 @ 08:31) (120/64 - 129/75)  Lipid Panel: Date/Time: 05-25-21 @ 10:42  Cholesterol, Serum: 162  Direct LDL: --  HDL Cholesterol, Serum: 70  Total Cholesterol/HDL Ration Measurement: --  Triglycerides, Serum: 57  
BMI: BMI (kg/m2): 24.1 (06-02-21 @ 08:30)  HbA1c: A1C with Estimated Average Glucose Result: 5.3 % (05-25-21 @ 10:42)    Glucose:   BP: 135/74 (06-16-21 @ 08:12) (113/61 - 135/74)  Lipid Panel: Date/Time: 05-25-21 @ 10:42  Cholesterol, Serum: 162  Direct LDL: --  HDL Cholesterol, Serum: 70  Total Cholesterol/HDL Ration Measurement: --  Triglycerides, Serum: 57  
BMI: BMI (kg/m2): 24.1 (06-02-21 @ 08:30)  HbA1c: A1C with Estimated Average Glucose Result: 5.3 % (05-25-21 @ 10:42)    Glucose:   BP: 112/71 (06-03-21 @ 07:58) (112/71 - 125/68)  Lipid Panel: Date/Time: 05-25-21 @ 10:42  Cholesterol, Serum: 162  Direct LDL: --  HDL Cholesterol, Serum: 70  Total Cholesterol/HDL Ration Measurement: --  Triglycerides, Serum: 57  
BMI: BMI (kg/m2): 24.1 (06-02-21 @ 08:30)  HbA1c: A1C with Estimated Average Glucose Result: 5.3 % (05-25-21 @ 10:42)    Glucose:   BP: 113/61 (06-15-21 @ 06:39) (11/68 - 113/61)  Lipid Panel: Date/Time: 05-25-21 @ 10:42  Cholesterol, Serum: 162  Direct LDL: --  HDL Cholesterol, Serum: 70  Total Cholesterol/HDL Ration Measurement: --  Triglycerides, Serum: 57  
BMI: BMI (kg/m2): 24.1 (06-02-21 @ 08:30)  HbA1c: A1C with Estimated Average Glucose Result: 5.3 % (05-25-21 @ 10:42)    Glucose:   BP: 128/70 (06-11-21 @ 06:25) (118/58 - 128/70)  Lipid Panel: Date/Time: 05-25-21 @ 10:42  Cholesterol, Serum: 162  Direct LDL: --  HDL Cholesterol, Serum: 70  Total Cholesterol/HDL Ration Measurement: --  Triglycerides, Serum: 57  
BMI: BMI (kg/m2): 24.1 (06-02-21 @ 08:30)  HbA1c: A1C with Estimated Average Glucose Result: 5.3 % (05-25-21 @ 10:42)    Glucose:   BP: 123/72 (07-01-21 @ 06:34) (123/72 - 123/72)  Lipid Panel: Date/Time: 05-25-21 @ 10:42  Cholesterol, Serum: 162  Direct LDL: --  HDL Cholesterol, Serum: 70  Total Cholesterol/HDL Ration Measurement: --  Triglycerides, Serum: 57  
BMI: BMI (kg/m2): 24.1 (06-02-21 @ 08:30)  HbA1c: A1C with Estimated Average Glucose Result: 5.3 % (05-25-21 @ 10:42)    Glucose:   BP: 125/68 (06-02-21 @ 06:42) (125/68 - 125/68)  Lipid Panel: Date/Time: 05-25-21 @ 10:42  Cholesterol, Serum: 162  Direct LDL: --  HDL Cholesterol, Serum: 70  Total Cholesterol/HDL Ration Measurement: --  Triglycerides, Serum: 57

## 2021-07-02 NOTE — BH INPATIENT PSYCHIATRY PROGRESS NOTE - NSTXVIOLNTDATETRGT_PSY_ALL_CORE
23-Jun-2021
09-Jun-2021
23-Jun-2021
09-Jun-2021
23-Jun-2021
09-Jun-2021
23-Jun-2021
09-Jun-2021
23-Jun-2021
09-Jun-2021
09-Jun-2021
23-Jun-2021
09-Jun-2021
09-Jun-2021
23-Jun-2021

## 2021-07-02 NOTE — BH INPATIENT PSYCHIATRY PROGRESS NOTE - NSTXPROBMANIC_PSY_ALL_CORE
MANIC SYMPTOMS

## 2021-07-02 NOTE — BH INPATIENT PSYCHIATRY PROGRESS NOTE - NSTXDEPRESDATETRGT_PSY_ALL_CORE
23-Jun-2021
02-Jun-2021
23-Jun-2021
23-Jun-2021
02-Jun-2021
23-Jun-2021
02-Jun-2021
02-Jun-2021
23-Jun-2021
23-Jun-2021
02-Jun-2021
23-Jun-2021
02-Jun-2021
23-Jun-2021
02-Jun-2021
02-Jun-2021
23-Jun-2021
02-Jun-2021
23-Jun-2021
02-Jun-2021
02-Jun-2021
23-Jun-2021
02-Jun-2021
02-Jun-2021

## 2021-07-02 NOTE — BH INPATIENT PSYCHIATRY PROGRESS NOTE - NSBHMSEATTEN_PSY_A_CORE
Impaired
Normal
Impaired
Normal
Impaired
Impaired
Normal
Impaired

## 2021-07-02 NOTE — BH INPATIENT PSYCHIATRY PROGRESS NOTE - NSBHMSEMOVE_PSY_A_CORE
Other

## 2021-07-02 NOTE — BH INPATIENT PSYCHIATRY PROGRESS NOTE - NSBHMSETHTPROC_PSY_A_CORE
Overinclusive/Circumstantial/Tangential/Perseverative/Illogical/Impaired reasoning
Overinclusive/Circumstantial/Impaired reasoning
Overinclusive/Circumstantial/Tangential/Perseverative/Illogical/Impaired reasoning
Overinclusive/Circumstantial/Impaired reasoning
Overinclusive/Circumstantial/Tangential/Perseverative/Illogical/Impaired reasoning
Overinclusive/Circumstantial/Impaired reasoning

## 2021-07-02 NOTE — BH DISCHARGE NOTE NURSING/SOCIAL WORK/PSYCH REHAB - PATIENT PORTAL LINK FT
You can access the FollowMyHealth Patient Portal offered by [facility] by registering at the following website: [email or web address]. By joining [facility]’s FollowMyHealth portal, you will also be able to view your health information using other applications (apps) compatible with our system.

## 2021-07-02 NOTE — BH INPATIENT PSYCHIATRY PROGRESS NOTE - NSBHMSEPERCEPT_PSY_A_CORE
Unable to assess
No abnormalities
Unable to assess
No abnormalities
Unable to assess
No abnormalities
Unable to assess

## 2021-07-02 NOTE — BH INPATIENT PSYCHIATRY PROGRESS NOTE - NSTXSUBMISPROGRES_PSY_ALL_CORE
Improving

## 2021-07-02 NOTE — BH INPATIENT PSYCHIATRY PROGRESS NOTE - NSTXIMPULSINTERMD_PSY_ALL_CORE
Psychopharm with goal of UMAÑA and supportive therapy
Psychopharm with goal of UMAÑA and supportive therapy now with RETENTION+MOO
Psychopharm with goal of UMAÑA and supportive therapy
Psychopharm with goal of UMAÑA and supportive therapy now with RETENTION+MOO

## 2021-07-02 NOTE — BH INPATIENT PSYCHIATRY DISCHARGE NOTE - NSDCCPCAREPLAN_GEN_ALL_CORE_FT
PRINCIPAL DISCHARGE DIAGNOSIS  Diagnosis: Bipolar I disorder, current or most recent episode manic, with psychotic features  Assessment and Plan of Treatment:

## 2021-07-02 NOTE — BH INPATIENT PSYCHIATRY PROGRESS NOTE - NSBHATTESTSEENBY_PSY_A_CORE
attending Psychiatrist without NP/Trainee
NP without Attending Psychiatrist
attending Psychiatrist without NP/Trainee
NP without Attending Psychiatrist
attending Psychiatrist without NP/Trainee
NP without Attending Psychiatrist
attending Psychiatrist without NP/Trainee

## 2021-07-02 NOTE — BH INPATIENT PSYCHIATRY PROGRESS NOTE - NSTXANXGOAL_PSY_ALL_CORE
Be able to participate in activities despite lingering anxiety/panic
Be able to perform ADLs and maintain safety despite anxiety/panic daily
Be able to participate in activities despite lingering anxiety/panic
Be able to perform ADLs and maintain safety despite anxiety/panic daily
Be able to perform ADLs and maintain safety despite anxiety/panic daily
Be able to participate in activities despite lingering anxiety/panic
Be able to perform ADLs and maintain safety despite anxiety/panic daily
Be able to perform ADLs and maintain safety despite anxiety/panic daily
Be able to participate in activities despite lingering anxiety/panic
Be able to perform ADLs and maintain safety despite anxiety/panic daily
Be able to participate in activities despite lingering anxiety/panic
Be able to perform ADLs and maintain safety despite anxiety/panic daily

## 2021-07-02 NOTE — BH INPATIENT PSYCHIATRY PROGRESS NOTE - NSBHMSEBODY_PSY_A_CORE
Average build/Other
Average build/Other
Average build
Average build/Other
Average build
Average build/Other
Average build/Other
Average build
Average build/Other
Average build/Other
Average build
Average build/Other
Average build
Average build/Other

## 2021-07-02 NOTE — BH INPATIENT PSYCHIATRY PROGRESS NOTE - NSBHMSEMUSCLE_PSY_A_CORE
Normal muscle tone/strength

## 2021-07-02 NOTE — BH INPATIENT PSYCHIATRY PROGRESS NOTE - NSBHMSEEYE_PSY_A_CORE
Fair
Good
Fair
Good
Fair
Good
Fair
Good
Fair
Good
Fair
Good

## 2021-07-02 NOTE — BH INPATIENT PSYCHIATRY PROGRESS NOTE - NSTREATMENTCERTY_PSY_ALL_CORE

## 2021-07-02 NOTE — BH DISCHARGE NOTE NURSING/SOCIAL WORK/PSYCH REHAB - NSDCPRRECOMMEND_PSY_ALL_CORE
Psych rehab staff recommends patient return to outpatient treatment for ongoing medication management, support and psychotherapy.

## 2021-07-02 NOTE — BH INPATIENT PSYCHIATRY PROGRESS NOTE - NSBHMSEGAIT_PSY_A_CORE
Normal gait / station

## 2021-07-02 NOTE — BH INPATIENT PSYCHIATRY PROGRESS NOTE - NSBHMSEINTELL_PSY_A_CORE
Below Average
Average
Below Average
Average
Below Average
Below Average
Average
Below Average

## 2021-07-02 NOTE — BH INPATIENT PSYCHIATRY PROGRESS NOTE - NSCGIIMPROVESX_PSY_ALL_CORE
3 = Minimally improved - slightly better with little or no clinically meaningful reduction of symptoms.  Represents very little change in basic clinical status, level of care, or functional capacity.

## 2021-07-02 NOTE — BH INPATIENT PSYCHIATRY DISCHARGE NOTE - NSBHMETABOLIC_PSY_ALL_CORE_FT
BMI: BMI (kg/m2): 24.1 (06-02-21 @ 08:30)  HbA1c: A1C with Estimated Average Glucose Result: 5.3 % (05-25-21 @ 10:42)    Glucose:   BP: 123/72 (07-01-21 @ 06:34) (123/72 - 123/72)  Lipid Panel: Date/Time: 05-25-21 @ 10:42  Cholesterol, Serum: 162  Direct LDL: --  HDL Cholesterol, Serum: 70  Total Cholesterol/HDL Ration Measurement: --  Triglycerides, Serum: 57

## 2021-07-02 NOTE — BH INPATIENT PSYCHIATRY PROGRESS NOTE - NSTXANXDATEEST_PSY_ALL_CORE
16-Jun-2021

## 2021-07-02 NOTE — BH INPATIENT PSYCHIATRY PROGRESS NOTE - NSTXIMPULSPROGRES_PSY_ALL_CORE
Improving
No Change
Improving
No Change
Improving
No Change
Improving
Improving
No Change
Improving
No Change
Improving
No Change
Improving

## 2021-07-02 NOTE — BH INPATIENT PSYCHIATRY PROGRESS NOTE - NSCGISEVERILLNESS_PSY_ALL_CORE
4 = Moderately ill – overt symptoms causing noticeable, but modest, functional impairment or distress; symptom level may warrant medication

## 2021-07-02 NOTE — BH INPATIENT PSYCHIATRY PROGRESS NOTE - NSTXCOPEPROGRES_PSY_ALL_CORE
Improving
No Change
Improving

## 2021-07-02 NOTE — BH INPATIENT PSYCHIATRY PROGRESS NOTE - NSTXVIOLNTDATEEST_PSY_ALL_CORE
16-Jun-2021
02-Jun-2021
16-Jun-2021
02-Jun-2021
16-Jun-2021
02-Jun-2021
02-Jun-2021
16-Jun-2021
02-Jun-2021
16-Jun-2021
02-Jun-2021
02-Jun-2021
16-Jun-2021
16-Jun-2021
02-Jun-2021
16-Jun-2021
16-Jun-2021
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
## 2021-07-02 NOTE — BH INPATIENT PSYCHIATRY PROGRESS NOTE - NSTXCONDUCINTERMD_PSY_ALL_CORE
Psychopharm with goal of UMAÑA and supportive therapy now with RETENTION+MOO
Psychopharm with goal of UMAÑA and supportive therapy now with RETENTION+MOO
Psychopharm with goal of UMAÑA and supportive therapy
Psychopharm with goal of UMAÑA and supportive therapy
Psychopharm with goal of UMAÑA and supportive therapy now with RETENTION+MOO

## 2021-07-02 NOTE — BH INPATIENT PSYCHIATRY PROGRESS NOTE - NSBHMSERECMEM_PSY_A_CORE
Impaired
Normal
Normal
Impaired
Impaired
Normal
Impaired

## 2021-07-02 NOTE — BH INPATIENT PSYCHIATRY PROGRESS NOTE - NSBHCONTPROVIDER_PSY_ALL_CORE
Not applicable

## 2021-07-02 NOTE — BH INPATIENT PSYCHIATRY PROGRESS NOTE - NSTXPROBPSYCHO_PSY_ALL_CORE
PSYCHOTIC SYMPTOMS

## 2021-07-02 NOTE — BH INPATIENT PSYCHIATRY PROGRESS NOTE - NSBHMSEMOOD_PSY_A_CORE
Anxious/Irritable
Anxious/Irritable/Euphoria
Irritable
Anxious/Irritable/Euphoria
Irritable
Anxious/Irritable
Anxious/Irritable/Euphoria
Anxious/Irritable/Euphoria
Anxious/Irritable
Anxious/Irritable
Anxious/Irritable/Euphoria
Anxious/Irritable/Euphoria
Normal/Anxious
Anxious/Irritable
Irritable
Irritable
Normal/Anxious
Anxious/Irritable
Anxious/Irritable/Euphoria
Anxious/Irritable
Irritable
Irritable
Anxious/Irritable
Anxious/Irritable
Normal/Anxious
Irritable
Anxious/Irritable/Euphoria
Anxious/Irritable/Euphoria

## 2021-07-02 NOTE — BH INPATIENT PSYCHIATRY PROGRESS NOTE - NSBHMSEAFFQUAL_PSY_A_CORE
Elevated/Irritable/Anxious/Other
Euthymic/Anxious/Other
Elevated/Irritable/Anxious/Other
Euthymic/Anxious/Other
Elevated/Irritable/Anxious/Other
Euthymic/Anxious/Other
Elevated/Irritable/Anxious/Other

## 2021-07-02 NOTE — BH INPATIENT PSYCHIATRY PROGRESS NOTE - NSTXCONDUCPROGRES_PSY_ALL_CORE
Improving
No Change
Improving

## 2021-07-02 NOTE — BH INPATIENT PSYCHIATRY PROGRESS NOTE - NSTXDEPRESDATEEST_PSY_ALL_CORE
26-May-2021
16-Jun-2021
16-Jun-2021
26-May-2021
16-Jun-2021
26-May-2021
16-Jun-2021
16-Jun-2021
26-May-2021
16-Jun-2021
26-May-2021
26-May-2021
16-Jun-2021
26-May-2021
16-Jun-2021
26-May-2021
16-Jun-2021
16-Jun-2021

## 2021-07-02 NOTE — BH INPATIENT PSYCHIATRY PROGRESS NOTE - NSTXCOPEDATETRGT_PSY_ALL_CORE
23-Jun-2021
23-Jun-2021
09-Jun-2021
23-Jun-2021
09-Jun-2021
23-Jun-2021
09-Jun-2021
09-Jun-2021
23-Jun-2021
09-Jun-2021
23-Jun-2021
09-Jun-2021
09-Jun-2021
23-Jun-2021
09-Jun-2021
23-Jun-2021
09-Jun-2021
23-Jun-2021
09-Jun-2021
23-Jun-2021
23-Jun-2021

## 2021-07-02 NOTE — BH INPATIENT PSYCHIATRY PROGRESS NOTE - NSTXMEDICGOAL_PSY_ALL_CORE
Take all medications as prescribed
Be able to describe the benefit of medication/treatment
Take all medications as prescribed
Be able to state dosages and times to take medications
Take all medications as prescribed
Be able to state dosages and times to take medications
Be able to state dosages and times to take medications
Take all medications as prescribed
Be able to describe the benefit of medication/treatment
Take all medications as prescribed
Be able to state dosages and times to take medications
Be able to describe the benefit of medication/treatment
Take all medications as prescribed
Take all medications as prescribed

## 2021-07-02 NOTE — BH INPATIENT PSYCHIATRY PROGRESS NOTE - NSBHMSELANG_PSY_A_CORE
No abnormalities noted

## 2021-07-02 NOTE — BH INPATIENT PSYCHIATRY PROGRESS NOTE - NSTXPSYCHODATEEST_PSY_ALL_CORE
16-Jun-2021
16-Jun-2021
02-Jun-2021
16-Jun-2021
02-Jun-2021
16-Jun-2021
16-Jun-2021
02-Jun-2021
16-Jun-2021
16-Jun-2021
02-Jun-2021
16-Jun-2021
02-Jun-2021
16-Jun-2021
02-Jun-2021
16-Jun-2021
16-Jun-2021
02-Jun-2021
16-Jun-2021

## 2021-07-02 NOTE — BH INPATIENT PSYCHIATRY PROGRESS NOTE - NSBHINPTBILLING_PSY_ALL_CORE
[id number] - Inpatient Moderate Complexity
05634 - Inpatient Moderate Complexity
97604 - Inpatient Moderate Complexity
48586 - Inpatient Moderate Complexity
47688 - Inpatient Moderate Complexity
35486 - Inpatient Moderate Complexity
83303 - Inpatient Moderate Complexity
76301 - Inpatient Moderate Complexity
97900 - Inpatient Moderate Complexity
39087 - Inpatient Moderate Complexity
52586 - Hospital Discharge Day Management; more than 30 min
59933 - Inpatient Moderate Complexity
11779 - Inpatient Moderate Complexity
71806 - Inpatient Moderate Complexity
94403 - Inpatient Moderate Complexity
48637 - Inpatient Moderate Complexity
13382 - Inpatient Moderate Complexity
94201 - Inpatient Moderate Complexity
38111 - Inpatient Moderate Complexity
96928 - Inpatient Moderate Complexity
47026 - Inpatient Moderate Complexity
70671 - Inpatient Moderate Complexity
15701 - Inpatient Moderate Complexity
00112 - Inpatient Moderate Complexity
52981 - Inpatient Moderate Complexity
Time based billing
40307 - Inpatient Moderate Complexity
49281 - Inpatient Moderate Complexity
52012 - Inpatient Moderate Complexity
10522 - Inpatient Moderate Complexity

## 2021-07-02 NOTE — BH INPATIENT PSYCHIATRY PROGRESS NOTE - NSTXDCOPLKGOAL_PSY_ALL_CORE
Will agree to consider an appropriate level of outpatient care

## 2021-07-02 NOTE — BH INPATIENT PSYCHIATRY PROGRESS NOTE - NSTXANXDATETRGT_PSY_ALL_CORE
23-Jun-2021

## 2021-07-02 NOTE — BH INPATIENT PSYCHIATRY PROGRESS NOTE - NSTXPROBIMPULS_PSY_ALL_CORE
IMPULSIVITY/AGITATION

## 2021-07-02 NOTE — BH INPATIENT PSYCHIATRY PROGRESS NOTE - NSBHMSEHYG_PSY_A_CORE
Fair
Poor
Fair
Poor
Poor
Good
Poor
Fair
Poor
Fair
Fair
Poor
Fair
Good
Poor
Fair
Fair
Good
Fair

## 2021-07-02 NOTE — BH INPATIENT PSYCHIATRY PROGRESS NOTE - NSTXSUBMISDATETRGT_PSY_ALL_CORE
09-Jun-2021
09-Jun-2021
23-Jun-2021
09-Jun-2021
23-Jun-2021
09-Jun-2021
23-Jun-2021
23-Jun-2021
09-Jun-2021
23-Jun-2021
23-Jun-2021
09-Jun-2021
23-Jun-2021
09-Jun-2021
23-Jun-2021
09-Jun-2021
23-Jun-2021

## 2021-07-02 NOTE — [facility] INPATIENT PSYCHIATRY PROGRESS NOTE - CURRENT MEDICATION
MEDICATIONS  (STANDING):  multivitamin/minerals 1 Tablet(s) Oral at bedtime    MEDICATIONS  (PRN):  acetaminophen   Tablet .. 325 milliGRAM(s) Oral every 6 hours PRN Temp greater or equal to 38C (100.4F), Mild Pain (1 - 3)  chlorproMAZINE    Injectable 100 milliGRAM(s) IntraMuscular once PRN severe agitation or aggression  chlorproMAZINE    Tablet 100 milliGRAM(s) Oral every 4 hours PRN severe agitation or aggression  diphenhydrAMINE 50 milliGRAM(s) Oral every 6 hours PRN eps ppx/agitation  diphenhydrAMINE   Injectable 50 milliGRAM(s) IntraMuscular once PRN severe agitation/eps ppx  haloperidol     Tablet 5 milliGRAM(s) Oral every 6 hours PRN agitation  haloperidol    Injectable 5 milliGRAM(s) IntraMuscular once PRN severe agitation  LORazepam     Tablet 2 milliGRAM(s) Oral every 6 hours PRN Agitation  LORazepam   Injectable 2 milliGRAM(s) IntraMuscular once PRN severe agitation  OLANZapine Injectable 10 milliGRAM(s) IntraMuscular once PRN MOO backup order  traZODone 50 milliGRAM(s) Oral at bedtime PRN insomnia  
MEDICATIONS  (STANDING):  ARIPiprazole 10 milliGRAM(s) Oral at bedtime    MEDICATIONS  (PRN):  acetaminophen   Tablet .. 325 milliGRAM(s) Oral every 6 hours PRN Temp greater or equal to 38C (100.4F), Mild Pain (1 - 3)  diphenhydrAMINE 50 milliGRAM(s) Oral every 6 hours PRN eps ppx/agitation  diphenhydrAMINE   Injectable 50 milliGRAM(s) IntraMuscular once PRN severe agitation/eps ppx  diphenhydrAMINE   Injectable 50 milliGRAM(s) IntraMuscular once PRN severe agitation/eps ppx  haloperidol     Tablet 5 milliGRAM(s) Oral every 6 hours PRN agitation  haloperidol    Injectable 5 milliGRAM(s) IntraMuscular once PRN severe agitation  haloperidol    Injectable 5 milliGRAM(s) IntraMuscular once PRN severe agitation  LORazepam     Tablet 2 milliGRAM(s) Oral every 6 hours PRN Agitation  LORazepam   Injectable 2 milliGRAM(s) IntraMuscular once PRN severe agitation  LORazepam   Injectable 2 milliGRAM(s) IntraMuscular once PRN severe agitation  traZODone 50 milliGRAM(s) Oral at bedtime PRN insomnia  
MEDICATIONS  (STANDING):  ARIPiprazole 10 milliGRAM(s) Oral at bedtime    MEDICATIONS  (PRN):  acetaminophen   Tablet .. 325 milliGRAM(s) Oral every 6 hours PRN Temp greater or equal to 38C (100.4F), Mild Pain (1 - 3)  chlorproMAZINE    Injectable 100 milliGRAM(s) IntraMuscular once PRN severe agitation or aggression  chlorproMAZINE    Tablet 100 milliGRAM(s) Oral every 4 hours PRN severe agitation or aggression  diphenhydrAMINE 50 milliGRAM(s) Oral every 6 hours PRN eps ppx/agitation  diphenhydrAMINE   Injectable 50 milliGRAM(s) IntraMuscular once PRN severe agitation/eps ppx  diphenhydrAMINE   Injectable 50 milliGRAM(s) IntraMuscular once PRN severe agitation/eps ppx  haloperidol     Tablet 5 milliGRAM(s) Oral every 6 hours PRN agitation  haloperidol    Injectable 5 milliGRAM(s) IntraMuscular once PRN severe agitation  haloperidol    Injectable 5 milliGRAM(s) IntraMuscular once PRN severe agitation  LORazepam     Tablet 2 milliGRAM(s) Oral every 6 hours PRN Agitation  LORazepam   Injectable 2 milliGRAM(s) IntraMuscular once PRN severe agitation  LORazepam   Injectable 2 milliGRAM(s) IntraMuscular once PRN severe agitation  traZODone 50 milliGRAM(s) Oral at bedtime PRN insomnia  
MEDICATIONS  (STANDING):  ARIPiprazole 10 milliGRAM(s) Oral at bedtime    MEDICATIONS  (PRN):  acetaminophen   Tablet .. 325 milliGRAM(s) Oral every 6 hours PRN Temp greater or equal to 38C (100.4F), Mild Pain (1 - 3)  diphenhydrAMINE 50 milliGRAM(s) Oral every 6 hours PRN eps ppx/agitation  diphenhydrAMINE   Injectable 50 milliGRAM(s) IntraMuscular once PRN severe agitation/eps ppx  haloperidol     Tablet 5 milliGRAM(s) Oral every 6 hours PRN agitation  haloperidol    Injectable 5 milliGRAM(s) IntraMuscular once PRN severe agitation  LORazepam     Tablet 2 milliGRAM(s) Oral every 6 hours PRN Agitation  LORazepam   Injectable 2 milliGRAM(s) IntraMuscular once PRN severe agitation  traZODone 50 milliGRAM(s) Oral at bedtime PRN insomnia  
MEDICATIONS  (STANDING):  multivitamin/minerals 1 Tablet(s) Oral at bedtime    MEDICATIONS  (PRN):  acetaminophen   Tablet .. 325 milliGRAM(s) Oral every 6 hours PRN Temp greater or equal to 38C (100.4F), Mild Pain (1 - 3)  chlorproMAZINE    Injectable 100 milliGRAM(s) IntraMuscular once PRN severe agitation or aggression  chlorproMAZINE    Tablet 100 milliGRAM(s) Oral every 4 hours PRN severe agitation or aggression  diphenhydrAMINE 50 milliGRAM(s) Oral every 6 hours PRN eps ppx/agitation  diphenhydrAMINE   Injectable 50 milliGRAM(s) IntraMuscular once PRN severe agitation/eps ppx  haloperidol     Tablet 5 milliGRAM(s) Oral every 6 hours PRN agitation  haloperidol    Injectable 5 milliGRAM(s) IntraMuscular once PRN severe agitation  LORazepam     Tablet 2 milliGRAM(s) Oral every 6 hours PRN Agitation  LORazepam   Injectable 2 milliGRAM(s) IntraMuscular once PRN severe agitation  OLANZapine Injectable 10 milliGRAM(s) IntraMuscular once PRN MOO backup order  traZODone 50 milliGRAM(s) Oral at bedtime PRN insomnia  
MEDICATIONS  (STANDING):  ARIPiprazole 10 milliGRAM(s) Oral at bedtime    MEDICATIONS  (PRN):  acetaminophen   Tablet .. 325 milliGRAM(s) Oral every 6 hours PRN Temp greater or equal to 38C (100.4F), Mild Pain (1 - 3)  chlorproMAZINE    Injectable 100 milliGRAM(s) IntraMuscular once PRN severe agitation or aggression  chlorproMAZINE    Tablet 100 milliGRAM(s) Oral every 4 hours PRN severe agitation or aggression  diphenhydrAMINE 50 milliGRAM(s) Oral every 6 hours PRN eps ppx/agitation  diphenhydrAMINE   Injectable 50 milliGRAM(s) IntraMuscular once PRN severe agitation/eps ppx  diphenhydrAMINE   Injectable 50 milliGRAM(s) IntraMuscular once PRN severe agitation/eps ppx  haloperidol     Tablet 5 milliGRAM(s) Oral every 6 hours PRN agitation  haloperidol    Injectable 5 milliGRAM(s) IntraMuscular once PRN severe agitation  haloperidol    Injectable 5 milliGRAM(s) IntraMuscular once PRN severe agitation  LORazepam     Tablet 2 milliGRAM(s) Oral every 6 hours PRN Agitation  LORazepam   Injectable 2 milliGRAM(s) IntraMuscular once PRN severe agitation  LORazepam   Injectable 2 milliGRAM(s) IntraMuscular once PRN severe agitation  traZODone 50 milliGRAM(s) Oral at bedtime PRN insomnia  
MEDICATIONS  (STANDING):  ARIPiprazole 10 milliGRAM(s) Oral at bedtime    MEDICATIONS  (PRN):  acetaminophen   Tablet .. 325 milliGRAM(s) Oral every 6 hours PRN Temp greater or equal to 38C (100.4F), Mild Pain (1 - 3)  chlorproMAZINE    Injectable 100 milliGRAM(s) IntraMuscular once PRN severe agitation or aggression  chlorproMAZINE    Tablet 100 milliGRAM(s) Oral every 4 hours PRN severe agitation or aggression  diphenhydrAMINE 50 milliGRAM(s) Oral every 6 hours PRN eps ppx/agitation  diphenhydrAMINE   Injectable 50 milliGRAM(s) IntraMuscular once PRN severe agitation/eps ppx  haloperidol     Tablet 5 milliGRAM(s) Oral every 6 hours PRN agitation  haloperidol    Injectable 5 milliGRAM(s) IntraMuscular once PRN severe agitation  LORazepam     Tablet 2 milliGRAM(s) Oral every 6 hours PRN Agitation  LORazepam   Injectable 2 milliGRAM(s) IntraMuscular once PRN severe agitation  traZODone 50 milliGRAM(s) Oral at bedtime PRN insomnia  
MEDICATIONS  (STANDING):  ARIPiprazole  Solution 10 milliGRAM(s) Oral at bedtime  multivitamin/minerals 1 Tablet(s) Oral at bedtime    MEDICATIONS  (PRN):  acetaminophen   Tablet .. 325 milliGRAM(s) Oral every 6 hours PRN Temp greater or equal to 38C (100.4F), Mild Pain (1 - 3)  chlorproMAZINE    Injectable 100 milliGRAM(s) IntraMuscular once PRN severe agitation or aggression  chlorproMAZINE    Tablet 100 milliGRAM(s) Oral every 4 hours PRN severe agitation or aggression  diphenhydrAMINE 50 milliGRAM(s) Oral every 6 hours PRN eps ppx/agitation  diphenhydrAMINE   Injectable 50 milliGRAM(s) IntraMuscular once PRN severe agitation/eps ppx  haloperidol     Tablet 5 milliGRAM(s) Oral every 6 hours PRN agitation  haloperidol    Injectable 5 milliGRAM(s) IntraMuscular once PRN severe agitation  LORazepam     Tablet 2 milliGRAM(s) Oral every 6 hours PRN Agitation  LORazepam   Injectable 2 milliGRAM(s) IntraMuscular once PRN severe agitation  OLANZapine Injectable 10 milliGRAM(s) IntraMuscular once PRN MOO backup order  traZODone 50 milliGRAM(s) Oral at bedtime PRN insomnia  
MEDICATIONS  (STANDING):  ARIPiprazole  Solution 20 milliGRAM(s) Oral at bedtime  multivitamin/minerals 1 Tablet(s) Oral at bedtime  naproxen 500 milliGRAM(s) Oral two times a day    MEDICATIONS  (PRN):  acetaminophen   Tablet .. 325 milliGRAM(s) Oral every 6 hours PRN Temp greater or equal to 38C (100.4F), Mild Pain (1 - 3)  chlorproMAZINE    Injectable 100 milliGRAM(s) IntraMuscular once PRN severe agitation or aggression  chlorproMAZINE    Tablet 100 milliGRAM(s) Oral every 4 hours PRN severe agitation or aggression  diphenhydrAMINE 50 milliGRAM(s) Oral every 6 hours PRN eps ppx/agitation  diphenhydrAMINE   Injectable 50 milliGRAM(s) IntraMuscular once PRN severe agitation/eps ppx  haloperidol     Tablet 5 milliGRAM(s) Oral every 6 hours PRN agitation  haloperidol    Injectable 5 milliGRAM(s) IntraMuscular once PRN severe agitation  LORazepam     Tablet 2 milliGRAM(s) Oral every 6 hours PRN Agitation  LORazepam   Injectable 2 milliGRAM(s) IntraMuscular once PRN severe agitation  OLANZapine Injectable 10 milliGRAM(s) IntraMuscular once PRN MOO backup order  traZODone 50 milliGRAM(s) Oral at bedtime PRN insomnia  
MEDICATIONS  (STANDING):  ARIPiprazole  Solution 10 milliGRAM(s) Oral at bedtime  multivitamin/minerals 1 Tablet(s) Oral at bedtime    MEDICATIONS  (PRN):  acetaminophen   Tablet .. 325 milliGRAM(s) Oral every 6 hours PRN Temp greater or equal to 38C (100.4F), Mild Pain (1 - 3)  chlorproMAZINE    Injectable 100 milliGRAM(s) IntraMuscular once PRN severe agitation or aggression  chlorproMAZINE    Tablet 100 milliGRAM(s) Oral every 4 hours PRN severe agitation or aggression  diphenhydrAMINE 50 milliGRAM(s) Oral every 6 hours PRN eps ppx/agitation  diphenhydrAMINE   Injectable 50 milliGRAM(s) IntraMuscular once PRN severe agitation/eps ppx  haloperidol     Tablet 5 milliGRAM(s) Oral every 6 hours PRN agitation  haloperidol    Injectable 5 milliGRAM(s) IntraMuscular once PRN severe agitation  LORazepam     Tablet 2 milliGRAM(s) Oral every 6 hours PRN Agitation  LORazepam   Injectable 2 milliGRAM(s) IntraMuscular once PRN severe agitation  traZODone 50 milliGRAM(s) Oral at bedtime PRN insomnia  
MEDICATIONS  (STANDING):  ARIPiprazole  Solution 10 milliGRAM(s) Oral at bedtime  multivitamin/minerals 1 Tablet(s) Oral at bedtime    MEDICATIONS  (PRN):  acetaminophen   Tablet .. 325 milliGRAM(s) Oral every 6 hours PRN Temp greater or equal to 38C (100.4F), Mild Pain (1 - 3)  chlorproMAZINE    Injectable 100 milliGRAM(s) IntraMuscular once PRN severe agitation or aggression  chlorproMAZINE    Tablet 100 milliGRAM(s) Oral every 4 hours PRN severe agitation or aggression  diphenhydrAMINE 50 milliGRAM(s) Oral every 6 hours PRN eps ppx/agitation  diphenhydrAMINE   Injectable 50 milliGRAM(s) IntraMuscular once PRN severe agitation/eps ppx  haloperidol     Tablet 5 milliGRAM(s) Oral every 6 hours PRN agitation  haloperidol    Injectable 5 milliGRAM(s) IntraMuscular once PRN severe agitation  LORazepam     Tablet 2 milliGRAM(s) Oral every 6 hours PRN Agitation  LORazepam   Injectable 2 milliGRAM(s) IntraMuscular once PRN severe agitation  OLANZapine Injectable 10 milliGRAM(s) IntraMuscular once PRN MOO backup order  traZODone 50 milliGRAM(s) Oral at bedtime PRN insomnia  
MEDICATIONS  (STANDING):  ARIPiprazole 10 milliGRAM(s) Oral at bedtime    MEDICATIONS  (PRN):  acetaminophen   Tablet .. 325 milliGRAM(s) Oral every 6 hours PRN Temp greater or equal to 38C (100.4F), Mild Pain (1 - 3)  chlorproMAZINE    Injectable 100 milliGRAM(s) IntraMuscular once PRN severe agitation or aggression  chlorproMAZINE    Tablet 100 milliGRAM(s) Oral every 4 hours PRN severe agitation or aggression  diphenhydrAMINE 50 milliGRAM(s) Oral every 6 hours PRN eps ppx/agitation  diphenhydrAMINE   Injectable 50 milliGRAM(s) IntraMuscular once PRN severe agitation/eps ppx  diphenhydrAMINE   Injectable 50 milliGRAM(s) IntraMuscular once PRN severe agitation/eps ppx  haloperidol     Tablet 5 milliGRAM(s) Oral every 6 hours PRN agitation  haloperidol    Injectable 5 milliGRAM(s) IntraMuscular once PRN severe agitation  haloperidol    Injectable 5 milliGRAM(s) IntraMuscular once PRN severe agitation  LORazepam     Tablet 2 milliGRAM(s) Oral every 6 hours PRN Agitation  LORazepam   Injectable 2 milliGRAM(s) IntraMuscular once PRN severe agitation  LORazepam   Injectable 2 milliGRAM(s) IntraMuscular once PRN severe agitation  traZODone 50 milliGRAM(s) Oral at bedtime PRN insomnia  
MEDICATIONS  (STANDING):  ARIPiprazole 10 milliGRAM(s) Oral at bedtime    MEDICATIONS  (PRN):  acetaminophen   Tablet .. 325 milliGRAM(s) Oral every 6 hours PRN Temp greater or equal to 38C (100.4F), Mild Pain (1 - 3)  chlorproMAZINE    Injectable 100 milliGRAM(s) IntraMuscular once PRN severe agitation or aggression  chlorproMAZINE    Tablet 100 milliGRAM(s) Oral every 4 hours PRN severe agitation or aggression  diphenhydrAMINE 50 milliGRAM(s) Oral every 6 hours PRN eps ppx/agitation  diphenhydrAMINE   Injectable 50 milliGRAM(s) IntraMuscular once PRN severe agitation/eps ppx  haloperidol     Tablet 5 milliGRAM(s) Oral every 6 hours PRN agitation  haloperidol    Injectable 5 milliGRAM(s) IntraMuscular once PRN severe agitation  LORazepam     Tablet 2 milliGRAM(s) Oral every 6 hours PRN Agitation  LORazepam   Injectable 2 milliGRAM(s) IntraMuscular once PRN severe agitation  traZODone 50 milliGRAM(s) Oral at bedtime PRN insomnia  
MEDICATIONS  (STANDING):  ARIPiprazole 10 milliGRAM(s) Oral at bedtime    MEDICATIONS  (PRN):  acetaminophen   Tablet .. 325 milliGRAM(s) Oral every 6 hours PRN Temp greater or equal to 38C (100.4F), Mild Pain (1 - 3)  chlorproMAZINE    Injectable 100 milliGRAM(s) IntraMuscular once PRN severe agitation or aggression  chlorproMAZINE    Tablet 100 milliGRAM(s) Oral every 4 hours PRN severe agitation or aggression  diphenhydrAMINE 50 milliGRAM(s) Oral every 6 hours PRN eps ppx/agitation  diphenhydrAMINE   Injectable 50 milliGRAM(s) IntraMuscular once PRN severe agitation/eps ppx  haloperidol     Tablet 5 milliGRAM(s) Oral every 6 hours PRN agitation  haloperidol    Injectable 5 milliGRAM(s) IntraMuscular once PRN severe agitation  LORazepam     Tablet 2 milliGRAM(s) Oral every 6 hours PRN Agitation  LORazepam   Injectable 2 milliGRAM(s) IntraMuscular once PRN severe agitation  traZODone 50 milliGRAM(s) Oral at bedtime PRN insomnia  
MEDICATIONS  (STANDING):  ARIPiprazole 15 milliGRAM(s) Oral at bedtime    MEDICATIONS  (PRN):  acetaminophen   Tablet .. 325 milliGRAM(s) Oral every 6 hours PRN Temp greater or equal to 38C (100.4F), Mild Pain (1 - 3)  chlorproMAZINE    Injectable 100 milliGRAM(s) IntraMuscular once PRN severe agitation or aggression  chlorproMAZINE    Tablet 100 milliGRAM(s) Oral every 4 hours PRN severe agitation or aggression  diphenhydrAMINE 50 milliGRAM(s) Oral every 6 hours PRN eps ppx/agitation  diphenhydrAMINE   Injectable 50 milliGRAM(s) IntraMuscular once PRN severe agitation/eps ppx  diphenhydrAMINE   Injectable 50 milliGRAM(s) IntraMuscular once PRN severe agitation/eps ppx  haloperidol     Tablet 5 milliGRAM(s) Oral every 6 hours PRN agitation  haloperidol    Injectable 5 milliGRAM(s) IntraMuscular once PRN severe agitation  haloperidol    Injectable 5 milliGRAM(s) IntraMuscular once PRN severe agitation  LORazepam     Tablet 2 milliGRAM(s) Oral every 6 hours PRN Agitation  LORazepam   Injectable 2 milliGRAM(s) IntraMuscular once PRN severe agitation  LORazepam   Injectable 2 milliGRAM(s) IntraMuscular once PRN severe agitation  traZODone 50 milliGRAM(s) Oral at bedtime PRN insomnia  
MEDICATIONS  (STANDING):  ARIPiprazole  Solution 20 milliGRAM(s) Oral at bedtime  multivitamin/minerals 1 Tablet(s) Oral at bedtime  naproxen 500 milliGRAM(s) Oral two times a day    MEDICATIONS  (PRN):  acetaminophen   Tablet .. 325 milliGRAM(s) Oral every 6 hours PRN Temp greater or equal to 38C (100.4F), Mild Pain (1 - 3)  chlorproMAZINE    Injectable 100 milliGRAM(s) IntraMuscular once PRN severe agitation or aggression  chlorproMAZINE    Tablet 100 milliGRAM(s) Oral every 4 hours PRN severe agitation or aggression  diphenhydrAMINE 50 milliGRAM(s) Oral every 6 hours PRN eps ppx/agitation  diphenhydrAMINE   Injectable 50 milliGRAM(s) IntraMuscular once PRN severe agitation/eps ppx  haloperidol     Tablet 5 milliGRAM(s) Oral every 6 hours PRN agitation  haloperidol    Injectable 5 milliGRAM(s) IntraMuscular once PRN severe agitation  LORazepam     Tablet 2 milliGRAM(s) Oral every 6 hours PRN Agitation  LORazepam   Injectable 2 milliGRAM(s) IntraMuscular once PRN severe agitation  OLANZapine Injectable 10 milliGRAM(s) IntraMuscular once PRN MOO backup order  traZODone 50 milliGRAM(s) Oral at bedtime PRN insomnia  
MEDICATIONS  (STANDING):  ARIPiprazole 10 milliGRAM(s) Oral at bedtime    MEDICATIONS  (PRN):  acetaminophen   Tablet .. 325 milliGRAM(s) Oral every 6 hours PRN Temp greater or equal to 38C (100.4F), Mild Pain (1 - 3)  diphenhydrAMINE 50 milliGRAM(s) Oral every 6 hours PRN eps ppx/agitation  diphenhydrAMINE   Injectable 50 milliGRAM(s) IntraMuscular once PRN severe agitation/eps ppx  haloperidol     Tablet 5 milliGRAM(s) Oral every 6 hours PRN agitation  haloperidol    Injectable 5 milliGRAM(s) IntraMuscular once PRN severe agitation  LORazepam     Tablet 2 milliGRAM(s) Oral every 6 hours PRN Agitation  LORazepam   Injectable 2 milliGRAM(s) IntraMuscular once PRN severe agitation  traZODone 50 milliGRAM(s) Oral at bedtime PRN insomnia  
MEDICATIONS  (STANDING):  multivitamin/minerals 1 Tablet(s) Oral at bedtime    MEDICATIONS  (PRN):  acetaminophen   Tablet .. 325 milliGRAM(s) Oral every 6 hours PRN Temp greater or equal to 38C (100.4F), Mild Pain (1 - 3)  chlorproMAZINE    Injectable 100 milliGRAM(s) IntraMuscular once PRN severe agitation or aggression  chlorproMAZINE    Tablet 100 milliGRAM(s) Oral every 4 hours PRN severe agitation or aggression  diphenhydrAMINE 50 milliGRAM(s) Oral every 6 hours PRN eps ppx/agitation  diphenhydrAMINE   Injectable 50 milliGRAM(s) IntraMuscular once PRN severe agitation/eps ppx  haloperidol     Tablet 5 milliGRAM(s) Oral every 6 hours PRN agitation  haloperidol    Injectable 5 milliGRAM(s) IntraMuscular once PRN severe agitation  LORazepam     Tablet 2 milliGRAM(s) Oral every 6 hours PRN Agitation  LORazepam   Injectable 2 milliGRAM(s) IntraMuscular once PRN severe agitation  OLANZapine Injectable 10 milliGRAM(s) IntraMuscular once PRN MOO backup order  traZODone 50 milliGRAM(s) Oral at bedtime PRN insomnia  
MEDICATIONS  (STANDING):  ARIPiprazole  Solution 15 milliGRAM(s) Oral at bedtime  multivitamin/minerals 1 Tablet(s) Oral at bedtime    MEDICATIONS  (PRN):  acetaminophen   Tablet .. 325 milliGRAM(s) Oral every 6 hours PRN Temp greater or equal to 38C (100.4F), Mild Pain (1 - 3)  chlorproMAZINE    Injectable 100 milliGRAM(s) IntraMuscular once PRN severe agitation or aggression  chlorproMAZINE    Tablet 100 milliGRAM(s) Oral every 4 hours PRN severe agitation or aggression  diphenhydrAMINE 50 milliGRAM(s) Oral every 6 hours PRN eps ppx/agitation  diphenhydrAMINE   Injectable 50 milliGRAM(s) IntraMuscular once PRN severe agitation/eps ppx  haloperidol     Tablet 5 milliGRAM(s) Oral every 6 hours PRN agitation  haloperidol    Injectable 5 milliGRAM(s) IntraMuscular once PRN severe agitation  LORazepam     Tablet 2 milliGRAM(s) Oral every 6 hours PRN Agitation  LORazepam   Injectable 2 milliGRAM(s) IntraMuscular once PRN severe agitation  OLANZapine Injectable 10 milliGRAM(s) IntraMuscular once PRN MOO backup order  traZODone 50 milliGRAM(s) Oral at bedtime PRN insomnia  
MEDICATIONS  (STANDING):  ARIPiprazole 10 milliGRAM(s) Oral at bedtime    MEDICATIONS  (PRN):  acetaminophen   Tablet .. 325 milliGRAM(s) Oral every 6 hours PRN Temp greater or equal to 38C (100.4F), Mild Pain (1 - 3)  chlorproMAZINE    Injectable 100 milliGRAM(s) IntraMuscular once PRN severe agitation or aggression  chlorproMAZINE    Tablet 100 milliGRAM(s) Oral every 4 hours PRN severe agitation or aggression  diphenhydrAMINE 50 milliGRAM(s) Oral every 6 hours PRN eps ppx/agitation  diphenhydrAMINE   Injectable 50 milliGRAM(s) IntraMuscular once PRN severe agitation/eps ppx  haloperidol     Tablet 5 milliGRAM(s) Oral every 6 hours PRN agitation  haloperidol    Injectable 5 milliGRAM(s) IntraMuscular once PRN severe agitation  LORazepam     Tablet 2 milliGRAM(s) Oral every 6 hours PRN Agitation  LORazepam   Injectable 2 milliGRAM(s) IntraMuscular once PRN severe agitation  traZODone 50 milliGRAM(s) Oral at bedtime PRN insomnia  
MEDICATIONS  (STANDING):  ARIPiprazole 10 milliGRAM(s) Oral at bedtime    MEDICATIONS  (PRN):  acetaminophen   Tablet .. 325 milliGRAM(s) Oral every 6 hours PRN Temp greater or equal to 38C (100.4F), Mild Pain (1 - 3)  diphenhydrAMINE 50 milliGRAM(s) Oral every 6 hours PRN eps ppx/agitation  diphenhydrAMINE   Injectable 50 milliGRAM(s) IntraMuscular once PRN severe agitation/eps ppx  haloperidol     Tablet 5 milliGRAM(s) Oral every 6 hours PRN agitation  haloperidol    Injectable 5 milliGRAM(s) IntraMuscular once PRN severe agitation  LORazepam     Tablet 2 milliGRAM(s) Oral every 6 hours PRN Agitation  LORazepam   Injectable 2 milliGRAM(s) IntraMuscular once PRN severe agitation  traZODone 50 milliGRAM(s) Oral at bedtime PRN insomnia  
MEDICATIONS  (STANDING):  ARIPiprazole  Solution 20 milliGRAM(s) Oral at bedtime  multivitamin/minerals 1 Tablet(s) Oral at bedtime  naproxen 500 milliGRAM(s) Oral two times a day    MEDICATIONS  (PRN):  acetaminophen   Tablet .. 325 milliGRAM(s) Oral every 6 hours PRN Temp greater or equal to 38C (100.4F), Mild Pain (1 - 3)  chlorproMAZINE    Injectable 100 milliGRAM(s) IntraMuscular once PRN severe agitation or aggression  chlorproMAZINE    Tablet 100 milliGRAM(s) Oral every 4 hours PRN severe agitation or aggression  diphenhydrAMINE 50 milliGRAM(s) Oral every 6 hours PRN eps ppx/agitation  diphenhydrAMINE   Injectable 50 milliGRAM(s) IntraMuscular once PRN severe agitation/eps ppx  haloperidol     Tablet 5 milliGRAM(s) Oral every 6 hours PRN agitation  haloperidol    Injectable 5 milliGRAM(s) IntraMuscular once PRN severe agitation  ibuprofen  Tablet. 600 milliGRAM(s) Oral every 6 hours PRN Mild Pain (1 - 3), Moderate Pain (4 - 6)  LORazepam     Tablet 2 milliGRAM(s) Oral every 6 hours PRN Agitation  LORazepam   Injectable 2 milliGRAM(s) IntraMuscular once PRN severe agitation  OLANZapine Injectable 10 milliGRAM(s) IntraMuscular once PRN MOO backup order  traZODone 50 milliGRAM(s) Oral at bedtime PRN insomnia  
MEDICATIONS  (STANDING):  ARIPiprazole 10 milliGRAM(s) Oral at bedtime    MEDICATIONS  (PRN):  acetaminophen   Tablet .. 325 milliGRAM(s) Oral every 6 hours PRN Temp greater or equal to 38C (100.4F), Mild Pain (1 - 3)  diphenhydrAMINE 50 milliGRAM(s) Oral every 6 hours PRN eps ppx/agitation  diphenhydrAMINE   Injectable 50 milliGRAM(s) IntraMuscular once PRN severe agitation/eps ppx  haloperidol     Tablet 5 milliGRAM(s) Oral every 6 hours PRN agitation  haloperidol    Injectable 5 milliGRAM(s) IntraMuscular once PRN severe agitation  LORazepam     Tablet 2 milliGRAM(s) Oral every 6 hours PRN Agitation  LORazepam   Injectable 2 milliGRAM(s) IntraMuscular once PRN severe agitation  traZODone 50 milliGRAM(s) Oral at bedtime PRN insomnia  
MEDICATIONS  (STANDING):  ARIPiprazole  Solution 20 milliGRAM(s) Oral at bedtime  multivitamin/minerals 1 Tablet(s) Oral at bedtime    MEDICATIONS  (PRN):  acetaminophen   Tablet .. 325 milliGRAM(s) Oral every 6 hours PRN Temp greater or equal to 38C (100.4F), Mild Pain (1 - 3)  chlorproMAZINE    Injectable 100 milliGRAM(s) IntraMuscular once PRN severe agitation or aggression  chlorproMAZINE    Tablet 100 milliGRAM(s) Oral every 4 hours PRN severe agitation or aggression  diphenhydrAMINE 50 milliGRAM(s) Oral every 6 hours PRN eps ppx/agitation  diphenhydrAMINE   Injectable 50 milliGRAM(s) IntraMuscular once PRN severe agitation/eps ppx  haloperidol     Tablet 5 milliGRAM(s) Oral every 6 hours PRN agitation  haloperidol    Injectable 5 milliGRAM(s) IntraMuscular once PRN severe agitation  ibuprofen  Tablet. 600 milliGRAM(s) Oral every 6 hours PRN Mild Pain (1 - 3), Moderate Pain (4 - 6)  LORazepam     Tablet 2 milliGRAM(s) Oral every 6 hours PRN Agitation  LORazepam   Injectable 2 milliGRAM(s) IntraMuscular once PRN severe agitation  OLANZapine Injectable 10 milliGRAM(s) IntraMuscular once PRN MOO backup order  traZODone 50 milliGRAM(s) Oral at bedtime PRN insomnia  
MEDICATIONS  (STANDING):  ARIPiprazole 10 milliGRAM(s) Oral at bedtime    MEDICATIONS  (PRN):  acetaminophen   Tablet .. 325 milliGRAM(s) Oral every 6 hours PRN Temp greater or equal to 38C (100.4F), Mild Pain (1 - 3)  chlorproMAZINE    Injectable 100 milliGRAM(s) IntraMuscular once PRN severe agitation or aggression  chlorproMAZINE    Tablet 100 milliGRAM(s) Oral every 4 hours PRN severe agitation or aggression  diphenhydrAMINE 50 milliGRAM(s) Oral every 6 hours PRN eps ppx/agitation  diphenhydrAMINE   Injectable 50 milliGRAM(s) IntraMuscular once PRN severe agitation/eps ppx  haloperidol     Tablet 5 milliGRAM(s) Oral every 6 hours PRN agitation  haloperidol    Injectable 5 milliGRAM(s) IntraMuscular once PRN severe agitation  LORazepam     Tablet 2 milliGRAM(s) Oral every 6 hours PRN Agitation  LORazepam   Injectable 2 milliGRAM(s) IntraMuscular once PRN severe agitation  traZODone 50 milliGRAM(s) Oral at bedtime PRN insomnia  
MEDICATIONS  (STANDING):  ARIPiprazole  Solution 15 milliGRAM(s) Oral at bedtime  multivitamin/minerals 1 Tablet(s) Oral at bedtime    MEDICATIONS  (PRN):  acetaminophen   Tablet .. 325 milliGRAM(s) Oral every 6 hours PRN Temp greater or equal to 38C (100.4F), Mild Pain (1 - 3)  chlorproMAZINE    Injectable 100 milliGRAM(s) IntraMuscular once PRN severe agitation or aggression  chlorproMAZINE    Tablet 100 milliGRAM(s) Oral every 4 hours PRN severe agitation or aggression  diphenhydrAMINE 50 milliGRAM(s) Oral every 6 hours PRN eps ppx/agitation  diphenhydrAMINE   Injectable 50 milliGRAM(s) IntraMuscular once PRN severe agitation/eps ppx  haloperidol     Tablet 5 milliGRAM(s) Oral every 6 hours PRN agitation  haloperidol    Injectable 5 milliGRAM(s) IntraMuscular once PRN severe agitation  ibuprofen  Tablet. 600 milliGRAM(s) Oral every 6 hours PRN Mild Pain (1 - 3), Moderate Pain (4 - 6)  LORazepam     Tablet 2 milliGRAM(s) Oral every 6 hours PRN Agitation  LORazepam   Injectable 2 milliGRAM(s) IntraMuscular once PRN severe agitation  OLANZapine Injectable 10 milliGRAM(s) IntraMuscular once PRN MOO backup order  traZODone 50 milliGRAM(s) Oral at bedtime PRN insomnia  
MEDICATIONS  (STANDING):  ARIPiprazole  Solution 20 milliGRAM(s) Oral at bedtime  multivitamin/minerals 1 Tablet(s) Oral at bedtime    MEDICATIONS  (PRN):  acetaminophen   Tablet .. 325 milliGRAM(s) Oral every 6 hours PRN Temp greater or equal to 38C (100.4F), Mild Pain (1 - 3)  chlorproMAZINE    Injectable 100 milliGRAM(s) IntraMuscular once PRN severe agitation or aggression  chlorproMAZINE    Tablet 100 milliGRAM(s) Oral every 4 hours PRN severe agitation or aggression  diphenhydrAMINE 50 milliGRAM(s) Oral every 6 hours PRN eps ppx/agitation  diphenhydrAMINE   Injectable 50 milliGRAM(s) IntraMuscular once PRN severe agitation/eps ppx  haloperidol     Tablet 5 milliGRAM(s) Oral every 6 hours PRN agitation  haloperidol    Injectable 5 milliGRAM(s) IntraMuscular once PRN severe agitation  LORazepam     Tablet 2 milliGRAM(s) Oral every 6 hours PRN Agitation  LORazepam   Injectable 2 milliGRAM(s) IntraMuscular once PRN severe agitation  OLANZapine Injectable 10 milliGRAM(s) IntraMuscular once PRN MOO backup order  traZODone 50 milliGRAM(s) Oral at bedtime PRN insomnia  
MEDICATIONS  (STANDING):  ARIPiprazole 10 milliGRAM(s) Oral at bedtime    MEDICATIONS  (PRN):  acetaminophen   Tablet .. 325 milliGRAM(s) Oral every 6 hours PRN Temp greater or equal to 38C (100.4F), Mild Pain (1 - 3)  chlorproMAZINE    Injectable 100 milliGRAM(s) IntraMuscular once PRN severe agitation or aggression  chlorproMAZINE    Tablet 100 milliGRAM(s) Oral every 4 hours PRN severe agitation or aggression  diphenhydrAMINE 50 milliGRAM(s) Oral every 6 hours PRN eps ppx/agitation  diphenhydrAMINE   Injectable 50 milliGRAM(s) IntraMuscular once PRN severe agitation/eps ppx  haloperidol     Tablet 5 milliGRAM(s) Oral every 6 hours PRN agitation  haloperidol    Injectable 5 milliGRAM(s) IntraMuscular once PRN severe agitation  LORazepam     Tablet 2 milliGRAM(s) Oral every 6 hours PRN Agitation  LORazepam   Injectable 2 milliGRAM(s) IntraMuscular once PRN severe agitation  traZODone 50 milliGRAM(s) Oral at bedtime PRN insomnia  
MEDICATIONS  (STANDING):  ARIPiprazole  Solution 20 milliGRAM(s) Oral at bedtime  multivitamin/minerals 1 Tablet(s) Oral at bedtime  naproxen 500 milliGRAM(s) Oral two times a day    MEDICATIONS  (PRN):  acetaminophen   Tablet .. 325 milliGRAM(s) Oral every 6 hours PRN Temp greater or equal to 38C (100.4F), Mild Pain (1 - 3)  chlorproMAZINE    Injectable 100 milliGRAM(s) IntraMuscular once PRN severe agitation or aggression  chlorproMAZINE    Tablet 100 milliGRAM(s) Oral every 4 hours PRN severe agitation or aggression  diphenhydrAMINE 50 milliGRAM(s) Oral every 6 hours PRN eps ppx/agitation  diphenhydrAMINE   Injectable 50 milliGRAM(s) IntraMuscular once PRN severe agitation/eps ppx  haloperidol     Tablet 5 milliGRAM(s) Oral every 6 hours PRN agitation  haloperidol    Injectable 5 milliGRAM(s) IntraMuscular once PRN severe agitation  LORazepam     Tablet 2 milliGRAM(s) Oral every 6 hours PRN Agitation  LORazepam   Injectable 2 milliGRAM(s) IntraMuscular once PRN severe agitation  OLANZapine Injectable 10 milliGRAM(s) IntraMuscular once PRN MOO backup order  traZODone 50 milliGRAM(s) Oral at bedtime PRN insomnia  
MEDICATIONS  (STANDING):  multivitamin/minerals 1 Tablet(s) Oral at bedtime    MEDICATIONS  (PRN):  acetaminophen   Tablet .. 325 milliGRAM(s) Oral every 6 hours PRN Temp greater or equal to 38C (100.4F), Mild Pain (1 - 3)  chlorproMAZINE    Injectable 100 milliGRAM(s) IntraMuscular once PRN severe agitation or aggression  chlorproMAZINE    Tablet 100 milliGRAM(s) Oral every 4 hours PRN severe agitation or aggression  diphenhydrAMINE 50 milliGRAM(s) Oral every 6 hours PRN eps ppx/agitation  diphenhydrAMINE   Injectable 50 milliGRAM(s) IntraMuscular once PRN severe agitation/eps ppx  haloperidol     Tablet 5 milliGRAM(s) Oral every 6 hours PRN agitation  haloperidol    Injectable 5 milliGRAM(s) IntraMuscular once PRN severe agitation  LORazepam     Tablet 2 milliGRAM(s) Oral every 6 hours PRN Agitation  LORazepam   Injectable 2 milliGRAM(s) IntraMuscular once PRN severe agitation  OLANZapine Injectable 10 milliGRAM(s) IntraMuscular once PRN MOO backup order  traZODone 50 milliGRAM(s) Oral at bedtime PRN insomnia

## 2021-07-02 NOTE — [facility] INPATIENT PSYCHIATRY PROGRESS NOTE - PRN MEDS
MEDICATIONS  (PRN):  acetaminophen   Tablet .. 325 milliGRAM(s) Oral every 6 hours PRN Temp greater or equal to 38C (100.4F), Mild Pain (1 - 3)  chlorproMAZINE    Injectable 100 milliGRAM(s) IntraMuscular once PRN severe agitation or aggression  chlorproMAZINE    Tablet 100 milliGRAM(s) Oral every 4 hours PRN severe agitation or aggression  diphenhydrAMINE 50 milliGRAM(s) Oral every 6 hours PRN eps ppx/agitation  diphenhydrAMINE   Injectable 50 milliGRAM(s) IntraMuscular once PRN severe agitation/eps ppx  haloperidol     Tablet 5 milliGRAM(s) Oral every 6 hours PRN agitation  haloperidol    Injectable 5 milliGRAM(s) IntraMuscular once PRN severe agitation  ibuprofen  Tablet. 600 milliGRAM(s) Oral every 6 hours PRN Mild Pain (1 - 3), Moderate Pain (4 - 6)  LORazepam     Tablet 2 milliGRAM(s) Oral every 6 hours PRN Agitation  LORazepam   Injectable 2 milliGRAM(s) IntraMuscular once PRN severe agitation  OLANZapine Injectable 10 milliGRAM(s) IntraMuscular once PRN MOO backup order  traZODone 50 milliGRAM(s) Oral at bedtime PRN insomnia  
MEDICATIONS  (PRN):  acetaminophen   Tablet .. 325 milliGRAM(s) Oral every 6 hours PRN Temp greater or equal to 38C (100.4F), Mild Pain (1 - 3)  chlorproMAZINE    Injectable 100 milliGRAM(s) IntraMuscular once PRN severe agitation or aggression  chlorproMAZINE    Tablet 100 milliGRAM(s) Oral every 4 hours PRN severe agitation or aggression  diphenhydrAMINE 50 milliGRAM(s) Oral every 6 hours PRN eps ppx/agitation  diphenhydrAMINE   Injectable 50 milliGRAM(s) IntraMuscular once PRN severe agitation/eps ppx  diphenhydrAMINE   Injectable 50 milliGRAM(s) IntraMuscular once PRN severe agitation/eps ppx  haloperidol     Tablet 5 milliGRAM(s) Oral every 6 hours PRN agitation  haloperidol    Injectable 5 milliGRAM(s) IntraMuscular once PRN severe agitation  haloperidol    Injectable 5 milliGRAM(s) IntraMuscular once PRN severe agitation  LORazepam     Tablet 2 milliGRAM(s) Oral every 6 hours PRN Agitation  LORazepam   Injectable 2 milliGRAM(s) IntraMuscular once PRN severe agitation  LORazepam   Injectable 2 milliGRAM(s) IntraMuscular once PRN severe agitation  traZODone 50 milliGRAM(s) Oral at bedtime PRN insomnia  
MEDICATIONS  (PRN):  acetaminophen   Tablet .. 325 milliGRAM(s) Oral every 6 hours PRN Temp greater or equal to 38C (100.4F), Mild Pain (1 - 3)  chlorproMAZINE    Injectable 100 milliGRAM(s) IntraMuscular once PRN severe agitation or aggression  chlorproMAZINE    Tablet 100 milliGRAM(s) Oral every 4 hours PRN severe agitation or aggression  diphenhydrAMINE 50 milliGRAM(s) Oral every 6 hours PRN eps ppx/agitation  diphenhydrAMINE   Injectable 50 milliGRAM(s) IntraMuscular once PRN severe agitation/eps ppx  haloperidol     Tablet 5 milliGRAM(s) Oral every 6 hours PRN agitation  haloperidol    Injectable 5 milliGRAM(s) IntraMuscular once PRN severe agitation  LORazepam     Tablet 2 milliGRAM(s) Oral every 6 hours PRN Agitation  LORazepam   Injectable 2 milliGRAM(s) IntraMuscular once PRN severe agitation  traZODone 50 milliGRAM(s) Oral at bedtime PRN insomnia  
MEDICATIONS  (PRN):  acetaminophen   Tablet .. 325 milliGRAM(s) Oral every 6 hours PRN Temp greater or equal to 38C (100.4F), Mild Pain (1 - 3)  chlorproMAZINE    Injectable 100 milliGRAM(s) IntraMuscular once PRN severe agitation or aggression  chlorproMAZINE    Tablet 100 milliGRAM(s) Oral every 4 hours PRN severe agitation or aggression  diphenhydrAMINE 50 milliGRAM(s) Oral every 6 hours PRN eps ppx/agitation  diphenhydrAMINE   Injectable 50 milliGRAM(s) IntraMuscular once PRN severe agitation/eps ppx  haloperidol     Tablet 5 milliGRAM(s) Oral every 6 hours PRN agitation  haloperidol    Injectable 5 milliGRAM(s) IntraMuscular once PRN severe agitation  LORazepam     Tablet 2 milliGRAM(s) Oral every 6 hours PRN Agitation  LORazepam   Injectable 2 milliGRAM(s) IntraMuscular once PRN severe agitation  OLANZapine Injectable 10 milliGRAM(s) IntraMuscular once PRN MOO backup order  traZODone 50 milliGRAM(s) Oral at bedtime PRN insomnia  
MEDICATIONS  (PRN):  acetaminophen   Tablet .. 325 milliGRAM(s) Oral every 6 hours PRN Temp greater or equal to 38C (100.4F), Mild Pain (1 - 3)  diphenhydrAMINE 50 milliGRAM(s) Oral every 6 hours PRN eps ppx/agitation  diphenhydrAMINE   Injectable 50 milliGRAM(s) IntraMuscular once PRN severe agitation/eps ppx  haloperidol     Tablet 5 milliGRAM(s) Oral every 6 hours PRN agitation  haloperidol    Injectable 5 milliGRAM(s) IntraMuscular once PRN severe agitation  LORazepam     Tablet 2 milliGRAM(s) Oral every 6 hours PRN Agitation  LORazepam   Injectable 2 milliGRAM(s) IntraMuscular once PRN severe agitation  traZODone 50 milliGRAM(s) Oral at bedtime PRN insomnia  
MEDICATIONS  (PRN):  acetaminophen   Tablet .. 325 milliGRAM(s) Oral every 6 hours PRN Temp greater or equal to 38C (100.4F), Mild Pain (1 - 3)  chlorproMAZINE    Injectable 100 milliGRAM(s) IntraMuscular once PRN severe agitation or aggression  chlorproMAZINE    Tablet 100 milliGRAM(s) Oral every 4 hours PRN severe agitation or aggression  diphenhydrAMINE 50 milliGRAM(s) Oral every 6 hours PRN eps ppx/agitation  diphenhydrAMINE   Injectable 50 milliGRAM(s) IntraMuscular once PRN severe agitation/eps ppx  haloperidol     Tablet 5 milliGRAM(s) Oral every 6 hours PRN agitation  haloperidol    Injectable 5 milliGRAM(s) IntraMuscular once PRN severe agitation  LORazepam     Tablet 2 milliGRAM(s) Oral every 6 hours PRN Agitation  LORazepam   Injectable 2 milliGRAM(s) IntraMuscular once PRN severe agitation  OLANZapine Injectable 10 milliGRAM(s) IntraMuscular once PRN MOO backup order  traZODone 50 milliGRAM(s) Oral at bedtime PRN insomnia  
MEDICATIONS  (PRN):  acetaminophen   Tablet .. 325 milliGRAM(s) Oral every 6 hours PRN Temp greater or equal to 38C (100.4F), Mild Pain (1 - 3)  chlorproMAZINE    Injectable 100 milliGRAM(s) IntraMuscular once PRN severe agitation or aggression  chlorproMAZINE    Tablet 100 milliGRAM(s) Oral every 4 hours PRN severe agitation or aggression  diphenhydrAMINE 50 milliGRAM(s) Oral every 6 hours PRN eps ppx/agitation  diphenhydrAMINE   Injectable 50 milliGRAM(s) IntraMuscular once PRN severe agitation/eps ppx  haloperidol     Tablet 5 milliGRAM(s) Oral every 6 hours PRN agitation  haloperidol    Injectable 5 milliGRAM(s) IntraMuscular once PRN severe agitation  LORazepam     Tablet 2 milliGRAM(s) Oral every 6 hours PRN Agitation  LORazepam   Injectable 2 milliGRAM(s) IntraMuscular once PRN severe agitation  traZODone 50 milliGRAM(s) Oral at bedtime PRN insomnia  
MEDICATIONS  (PRN):  acetaminophen   Tablet .. 325 milliGRAM(s) Oral every 6 hours PRN Temp greater or equal to 38C (100.4F), Mild Pain (1 - 3)  chlorproMAZINE    Injectable 100 milliGRAM(s) IntraMuscular once PRN severe agitation or aggression  chlorproMAZINE    Tablet 100 milliGRAM(s) Oral every 4 hours PRN severe agitation or aggression  diphenhydrAMINE 50 milliGRAM(s) Oral every 6 hours PRN eps ppx/agitation  diphenhydrAMINE   Injectable 50 milliGRAM(s) IntraMuscular once PRN severe agitation/eps ppx  haloperidol     Tablet 5 milliGRAM(s) Oral every 6 hours PRN agitation  haloperidol    Injectable 5 milliGRAM(s) IntraMuscular once PRN severe agitation  LORazepam     Tablet 2 milliGRAM(s) Oral every 6 hours PRN Agitation  LORazepam   Injectable 2 milliGRAM(s) IntraMuscular once PRN severe agitation  OLANZapine Injectable 10 milliGRAM(s) IntraMuscular once PRN MOO backup order  traZODone 50 milliGRAM(s) Oral at bedtime PRN insomnia  
MEDICATIONS  (PRN):  acetaminophen   Tablet .. 325 milliGRAM(s) Oral every 6 hours PRN Temp greater or equal to 38C (100.4F), Mild Pain (1 - 3)  chlorproMAZINE    Injectable 100 milliGRAM(s) IntraMuscular once PRN severe agitation or aggression  chlorproMAZINE    Tablet 100 milliGRAM(s) Oral every 4 hours PRN severe agitation or aggression  diphenhydrAMINE 50 milliGRAM(s) Oral every 6 hours PRN eps ppx/agitation  diphenhydrAMINE   Injectable 50 milliGRAM(s) IntraMuscular once PRN severe agitation/eps ppx  haloperidol     Tablet 5 milliGRAM(s) Oral every 6 hours PRN agitation  haloperidol    Injectable 5 milliGRAM(s) IntraMuscular once PRN severe agitation  LORazepam     Tablet 2 milliGRAM(s) Oral every 6 hours PRN Agitation  LORazepam   Injectable 2 milliGRAM(s) IntraMuscular once PRN severe agitation  OLANZapine Injectable 10 milliGRAM(s) IntraMuscular once PRN MOO backup order  traZODone 50 milliGRAM(s) Oral at bedtime PRN insomnia  
MEDICATIONS  (PRN):  acetaminophen   Tablet .. 325 milliGRAM(s) Oral every 6 hours PRN Temp greater or equal to 38C (100.4F), Mild Pain (1 - 3)  chlorproMAZINE    Injectable 100 milliGRAM(s) IntraMuscular once PRN severe agitation or aggression  chlorproMAZINE    Tablet 100 milliGRAM(s) Oral every 4 hours PRN severe agitation or aggression  diphenhydrAMINE 50 milliGRAM(s) Oral every 6 hours PRN eps ppx/agitation  diphenhydrAMINE   Injectable 50 milliGRAM(s) IntraMuscular once PRN severe agitation/eps ppx  diphenhydrAMINE   Injectable 50 milliGRAM(s) IntraMuscular once PRN severe agitation/eps ppx  haloperidol     Tablet 5 milliGRAM(s) Oral every 6 hours PRN agitation  haloperidol    Injectable 5 milliGRAM(s) IntraMuscular once PRN severe agitation  haloperidol    Injectable 5 milliGRAM(s) IntraMuscular once PRN severe agitation  LORazepam     Tablet 2 milliGRAM(s) Oral every 6 hours PRN Agitation  LORazepam   Injectable 2 milliGRAM(s) IntraMuscular once PRN severe agitation  LORazepam   Injectable 2 milliGRAM(s) IntraMuscular once PRN severe agitation  traZODone 50 milliGRAM(s) Oral at bedtime PRN insomnia  
MEDICATIONS  (PRN):  acetaminophen   Tablet .. 325 milliGRAM(s) Oral every 6 hours PRN Temp greater or equal to 38C (100.4F), Mild Pain (1 - 3)  chlorproMAZINE    Injectable 100 milliGRAM(s) IntraMuscular once PRN severe agitation or aggression  chlorproMAZINE    Tablet 100 milliGRAM(s) Oral every 4 hours PRN severe agitation or aggression  diphenhydrAMINE 50 milliGRAM(s) Oral every 6 hours PRN eps ppx/agitation  diphenhydrAMINE   Injectable 50 milliGRAM(s) IntraMuscular once PRN severe agitation/eps ppx  haloperidol     Tablet 5 milliGRAM(s) Oral every 6 hours PRN agitation  haloperidol    Injectable 5 milliGRAM(s) IntraMuscular once PRN severe agitation  LORazepam     Tablet 2 milliGRAM(s) Oral every 6 hours PRN Agitation  LORazepam   Injectable 2 milliGRAM(s) IntraMuscular once PRN severe agitation  OLANZapine Injectable 10 milliGRAM(s) IntraMuscular once PRN MOO backup order  traZODone 50 milliGRAM(s) Oral at bedtime PRN insomnia  
MEDICATIONS  (PRN):  acetaminophen   Tablet .. 325 milliGRAM(s) Oral every 6 hours PRN Temp greater or equal to 38C (100.4F), Mild Pain (1 - 3)  chlorproMAZINE    Injectable 100 milliGRAM(s) IntraMuscular once PRN severe agitation or aggression  chlorproMAZINE    Tablet 100 milliGRAM(s) Oral every 4 hours PRN severe agitation or aggression  diphenhydrAMINE 50 milliGRAM(s) Oral every 6 hours PRN eps ppx/agitation  diphenhydrAMINE   Injectable 50 milliGRAM(s) IntraMuscular once PRN severe agitation/eps ppx  haloperidol     Tablet 5 milliGRAM(s) Oral every 6 hours PRN agitation  haloperidol    Injectable 5 milliGRAM(s) IntraMuscular once PRN severe agitation  LORazepam     Tablet 2 milliGRAM(s) Oral every 6 hours PRN Agitation  LORazepam   Injectable 2 milliGRAM(s) IntraMuscular once PRN severe agitation  traZODone 50 milliGRAM(s) Oral at bedtime PRN insomnia  
MEDICATIONS  (PRN):  acetaminophen   Tablet .. 325 milliGRAM(s) Oral every 6 hours PRN Temp greater or equal to 38C (100.4F), Mild Pain (1 - 3)  chlorproMAZINE    Injectable 100 milliGRAM(s) IntraMuscular once PRN severe agitation or aggression  chlorproMAZINE    Tablet 100 milliGRAM(s) Oral every 4 hours PRN severe agitation or aggression  diphenhydrAMINE 50 milliGRAM(s) Oral every 6 hours PRN eps ppx/agitation  diphenhydrAMINE   Injectable 50 milliGRAM(s) IntraMuscular once PRN severe agitation/eps ppx  haloperidol     Tablet 5 milliGRAM(s) Oral every 6 hours PRN agitation  haloperidol    Injectable 5 milliGRAM(s) IntraMuscular once PRN severe agitation  LORazepam     Tablet 2 milliGRAM(s) Oral every 6 hours PRN Agitation  LORazepam   Injectable 2 milliGRAM(s) IntraMuscular once PRN severe agitation  traZODone 50 milliGRAM(s) Oral at bedtime PRN insomnia  
MEDICATIONS  (PRN):  acetaminophen   Tablet .. 325 milliGRAM(s) Oral every 6 hours PRN Temp greater or equal to 38C (100.4F), Mild Pain (1 - 3)  diphenhydrAMINE 50 milliGRAM(s) Oral every 6 hours PRN eps ppx/agitation  diphenhydrAMINE   Injectable 50 milliGRAM(s) IntraMuscular once PRN severe agitation/eps ppx  diphenhydrAMINE   Injectable 50 milliGRAM(s) IntraMuscular once PRN severe agitation/eps ppx  haloperidol     Tablet 5 milliGRAM(s) Oral every 6 hours PRN agitation  haloperidol    Injectable 5 milliGRAM(s) IntraMuscular once PRN severe agitation  haloperidol    Injectable 5 milliGRAM(s) IntraMuscular once PRN severe agitation  LORazepam     Tablet 2 milliGRAM(s) Oral every 6 hours PRN Agitation  LORazepam   Injectable 2 milliGRAM(s) IntraMuscular once PRN severe agitation  LORazepam   Injectable 2 milliGRAM(s) IntraMuscular once PRN severe agitation  traZODone 50 milliGRAM(s) Oral at bedtime PRN insomnia  
MEDICATIONS  (PRN):  acetaminophen   Tablet .. 325 milliGRAM(s) Oral every 6 hours PRN Temp greater or equal to 38C (100.4F), Mild Pain (1 - 3)  chlorproMAZINE    Injectable 100 milliGRAM(s) IntraMuscular once PRN severe agitation or aggression  chlorproMAZINE    Tablet 100 milliGRAM(s) Oral every 4 hours PRN severe agitation or aggression  diphenhydrAMINE 50 milliGRAM(s) Oral every 6 hours PRN eps ppx/agitation  diphenhydrAMINE   Injectable 50 milliGRAM(s) IntraMuscular once PRN severe agitation/eps ppx  haloperidol     Tablet 5 milliGRAM(s) Oral every 6 hours PRN agitation  haloperidol    Injectable 5 milliGRAM(s) IntraMuscular once PRN severe agitation  LORazepam     Tablet 2 milliGRAM(s) Oral every 6 hours PRN Agitation  LORazepam   Injectable 2 milliGRAM(s) IntraMuscular once PRN severe agitation  OLANZapine Injectable 10 milliGRAM(s) IntraMuscular once PRN MOO backup order  traZODone 50 milliGRAM(s) Oral at bedtime PRN insomnia  
MEDICATIONS  (PRN):  acetaminophen   Tablet .. 325 milliGRAM(s) Oral every 6 hours PRN Temp greater or equal to 38C (100.4F), Mild Pain (1 - 3)  diphenhydrAMINE 50 milliGRAM(s) Oral every 6 hours PRN eps ppx/agitation  diphenhydrAMINE   Injectable 50 milliGRAM(s) IntraMuscular once PRN severe agitation/eps ppx  haloperidol     Tablet 5 milliGRAM(s) Oral every 6 hours PRN agitation  haloperidol    Injectable 5 milliGRAM(s) IntraMuscular once PRN severe agitation  LORazepam     Tablet 2 milliGRAM(s) Oral every 6 hours PRN Agitation  LORazepam   Injectable 2 milliGRAM(s) IntraMuscular once PRN severe agitation  traZODone 50 milliGRAM(s) Oral at bedtime PRN insomnia  
MEDICATIONS  (PRN):  acetaminophen   Tablet .. 325 milliGRAM(s) Oral every 6 hours PRN Temp greater or equal to 38C (100.4F), Mild Pain (1 - 3)  chlorproMAZINE    Injectable 100 milliGRAM(s) IntraMuscular once PRN severe agitation or aggression  chlorproMAZINE    Tablet 100 milliGRAM(s) Oral every 4 hours PRN severe agitation or aggression  diphenhydrAMINE 50 milliGRAM(s) Oral every 6 hours PRN eps ppx/agitation  diphenhydrAMINE   Injectable 50 milliGRAM(s) IntraMuscular once PRN severe agitation/eps ppx  haloperidol     Tablet 5 milliGRAM(s) Oral every 6 hours PRN agitation  haloperidol    Injectable 5 milliGRAM(s) IntraMuscular once PRN severe agitation  LORazepam     Tablet 2 milliGRAM(s) Oral every 6 hours PRN Agitation  LORazepam   Injectable 2 milliGRAM(s) IntraMuscular once PRN severe agitation  traZODone 50 milliGRAM(s) Oral at bedtime PRN insomnia  
MEDICATIONS  (PRN):  acetaminophen   Tablet .. 325 milliGRAM(s) Oral every 6 hours PRN Temp greater or equal to 38C (100.4F), Mild Pain (1 - 3)  chlorproMAZINE    Injectable 100 milliGRAM(s) IntraMuscular once PRN severe agitation or aggression  chlorproMAZINE    Tablet 100 milliGRAM(s) Oral every 4 hours PRN severe agitation or aggression  diphenhydrAMINE 50 milliGRAM(s) Oral every 6 hours PRN eps ppx/agitation  diphenhydrAMINE   Injectable 50 milliGRAM(s) IntraMuscular once PRN severe agitation/eps ppx  haloperidol     Tablet 5 milliGRAM(s) Oral every 6 hours PRN agitation  haloperidol    Injectable 5 milliGRAM(s) IntraMuscular once PRN severe agitation  LORazepam     Tablet 2 milliGRAM(s) Oral every 6 hours PRN Agitation  LORazepam   Injectable 2 milliGRAM(s) IntraMuscular once PRN severe agitation  OLANZapine Injectable 10 milliGRAM(s) IntraMuscular once PRN MOO backup order  traZODone 50 milliGRAM(s) Oral at bedtime PRN insomnia  
MEDICATIONS  (PRN):  acetaminophen   Tablet .. 325 milliGRAM(s) Oral every 6 hours PRN Temp greater or equal to 38C (100.4F), Mild Pain (1 - 3)  chlorproMAZINE    Injectable 100 milliGRAM(s) IntraMuscular once PRN severe agitation or aggression  chlorproMAZINE    Tablet 100 milliGRAM(s) Oral every 4 hours PRN severe agitation or aggression  diphenhydrAMINE 50 milliGRAM(s) Oral every 6 hours PRN eps ppx/agitation  diphenhydrAMINE   Injectable 50 milliGRAM(s) IntraMuscular once PRN severe agitation/eps ppx  diphenhydrAMINE   Injectable 50 milliGRAM(s) IntraMuscular once PRN severe agitation/eps ppx  haloperidol     Tablet 5 milliGRAM(s) Oral every 6 hours PRN agitation  haloperidol    Injectable 5 milliGRAM(s) IntraMuscular once PRN severe agitation  haloperidol    Injectable 5 milliGRAM(s) IntraMuscular once PRN severe agitation  LORazepam     Tablet 2 milliGRAM(s) Oral every 6 hours PRN Agitation  LORazepam   Injectable 2 milliGRAM(s) IntraMuscular once PRN severe agitation  LORazepam   Injectable 2 milliGRAM(s) IntraMuscular once PRN severe agitation  traZODone 50 milliGRAM(s) Oral at bedtime PRN insomnia  
MEDICATIONS  (PRN):  acetaminophen   Tablet .. 325 milliGRAM(s) Oral every 6 hours PRN Temp greater or equal to 38C (100.4F), Mild Pain (1 - 3)  chlorproMAZINE    Injectable 100 milliGRAM(s) IntraMuscular once PRN severe agitation or aggression  chlorproMAZINE    Tablet 100 milliGRAM(s) Oral every 4 hours PRN severe agitation or aggression  diphenhydrAMINE 50 milliGRAM(s) Oral every 6 hours PRN eps ppx/agitation  diphenhydrAMINE   Injectable 50 milliGRAM(s) IntraMuscular once PRN severe agitation/eps ppx  diphenhydrAMINE   Injectable 50 milliGRAM(s) IntraMuscular once PRN severe agitation/eps ppx  haloperidol     Tablet 5 milliGRAM(s) Oral every 6 hours PRN agitation  haloperidol    Injectable 5 milliGRAM(s) IntraMuscular once PRN severe agitation  haloperidol    Injectable 5 milliGRAM(s) IntraMuscular once PRN severe agitation  LORazepam     Tablet 2 milliGRAM(s) Oral every 6 hours PRN Agitation  LORazepam   Injectable 2 milliGRAM(s) IntraMuscular once PRN severe agitation  LORazepam   Injectable 2 milliGRAM(s) IntraMuscular once PRN severe agitation  traZODone 50 milliGRAM(s) Oral at bedtime PRN insomnia  
MEDICATIONS  (PRN):  acetaminophen   Tablet .. 325 milliGRAM(s) Oral every 6 hours PRN Temp greater or equal to 38C (100.4F), Mild Pain (1 - 3)  chlorproMAZINE    Injectable 100 milliGRAM(s) IntraMuscular once PRN severe agitation or aggression  chlorproMAZINE    Tablet 100 milliGRAM(s) Oral every 4 hours PRN severe agitation or aggression  diphenhydrAMINE 50 milliGRAM(s) Oral every 6 hours PRN eps ppx/agitation  diphenhydrAMINE   Injectable 50 milliGRAM(s) IntraMuscular once PRN severe agitation/eps ppx  haloperidol     Tablet 5 milliGRAM(s) Oral every 6 hours PRN agitation  haloperidol    Injectable 5 milliGRAM(s) IntraMuscular once PRN severe agitation  LORazepam     Tablet 2 milliGRAM(s) Oral every 6 hours PRN Agitation  LORazepam   Injectable 2 milliGRAM(s) IntraMuscular once PRN severe agitation  traZODone 50 milliGRAM(s) Oral at bedtime PRN insomnia  
MEDICATIONS  (PRN):  acetaminophen   Tablet .. 325 milliGRAM(s) Oral every 6 hours PRN Temp greater or equal to 38C (100.4F), Mild Pain (1 - 3)  chlorproMAZINE    Injectable 100 milliGRAM(s) IntraMuscular once PRN severe agitation or aggression  chlorproMAZINE    Tablet 100 milliGRAM(s) Oral every 4 hours PRN severe agitation or aggression  diphenhydrAMINE 50 milliGRAM(s) Oral every 6 hours PRN eps ppx/agitation  diphenhydrAMINE   Injectable 50 milliGRAM(s) IntraMuscular once PRN severe agitation/eps ppx  haloperidol     Tablet 5 milliGRAM(s) Oral every 6 hours PRN agitation  haloperidol    Injectable 5 milliGRAM(s) IntraMuscular once PRN severe agitation  LORazepam     Tablet 2 milliGRAM(s) Oral every 6 hours PRN Agitation  LORazepam   Injectable 2 milliGRAM(s) IntraMuscular once PRN severe agitation  traZODone 50 milliGRAM(s) Oral at bedtime PRN insomnia  
MEDICATIONS  (PRN):  acetaminophen   Tablet .. 325 milliGRAM(s) Oral every 6 hours PRN Temp greater or equal to 38C (100.4F), Mild Pain (1 - 3)  chlorproMAZINE    Injectable 100 milliGRAM(s) IntraMuscular once PRN severe agitation or aggression  chlorproMAZINE    Tablet 100 milliGRAM(s) Oral every 4 hours PRN severe agitation or aggression  diphenhydrAMINE 50 milliGRAM(s) Oral every 6 hours PRN eps ppx/agitation  diphenhydrAMINE   Injectable 50 milliGRAM(s) IntraMuscular once PRN severe agitation/eps ppx  haloperidol     Tablet 5 milliGRAM(s) Oral every 6 hours PRN agitation  haloperidol    Injectable 5 milliGRAM(s) IntraMuscular once PRN severe agitation  LORazepam     Tablet 2 milliGRAM(s) Oral every 6 hours PRN Agitation  LORazepam   Injectable 2 milliGRAM(s) IntraMuscular once PRN severe agitation  OLANZapine Injectable 10 milliGRAM(s) IntraMuscular once PRN MOO backup order  traZODone 50 milliGRAM(s) Oral at bedtime PRN insomnia  
MEDICATIONS  (PRN):  acetaminophen   Tablet .. 325 milliGRAM(s) Oral every 6 hours PRN Temp greater or equal to 38C (100.4F), Mild Pain (1 - 3)  chlorproMAZINE    Injectable 100 milliGRAM(s) IntraMuscular once PRN severe agitation or aggression  chlorproMAZINE    Tablet 100 milliGRAM(s) Oral every 4 hours PRN severe agitation or aggression  diphenhydrAMINE 50 milliGRAM(s) Oral every 6 hours PRN eps ppx/agitation  diphenhydrAMINE   Injectable 50 milliGRAM(s) IntraMuscular once PRN severe agitation/eps ppx  haloperidol     Tablet 5 milliGRAM(s) Oral every 6 hours PRN agitation  haloperidol    Injectable 5 milliGRAM(s) IntraMuscular once PRN severe agitation  ibuprofen  Tablet. 600 milliGRAM(s) Oral every 6 hours PRN Mild Pain (1 - 3), Moderate Pain (4 - 6)  LORazepam     Tablet 2 milliGRAM(s) Oral every 6 hours PRN Agitation  LORazepam   Injectable 2 milliGRAM(s) IntraMuscular once PRN severe agitation  OLANZapine Injectable 10 milliGRAM(s) IntraMuscular once PRN MOO backup order  traZODone 50 milliGRAM(s) Oral at bedtime PRN insomnia  
MEDICATIONS  (PRN):  acetaminophen   Tablet .. 325 milliGRAM(s) Oral every 6 hours PRN Temp greater or equal to 38C (100.4F), Mild Pain (1 - 3)  chlorproMAZINE    Injectable 100 milliGRAM(s) IntraMuscular once PRN severe agitation or aggression  chlorproMAZINE    Tablet 100 milliGRAM(s) Oral every 4 hours PRN severe agitation or aggression  diphenhydrAMINE 50 milliGRAM(s) Oral every 6 hours PRN eps ppx/agitation  diphenhydrAMINE   Injectable 50 milliGRAM(s) IntraMuscular once PRN severe agitation/eps ppx  haloperidol     Tablet 5 milliGRAM(s) Oral every 6 hours PRN agitation  haloperidol    Injectable 5 milliGRAM(s) IntraMuscular once PRN severe agitation  LORazepam     Tablet 2 milliGRAM(s) Oral every 6 hours PRN Agitation  LORazepam   Injectable 2 milliGRAM(s) IntraMuscular once PRN severe agitation  OLANZapine Injectable 10 milliGRAM(s) IntraMuscular once PRN MOO backup order  traZODone 50 milliGRAM(s) Oral at bedtime PRN insomnia  
MEDICATIONS  (PRN):  acetaminophen   Tablet .. 325 milliGRAM(s) Oral every 6 hours PRN Temp greater or equal to 38C (100.4F), Mild Pain (1 - 3)  chlorproMAZINE    Injectable 100 milliGRAM(s) IntraMuscular once PRN severe agitation or aggression  chlorproMAZINE    Tablet 100 milliGRAM(s) Oral every 4 hours PRN severe agitation or aggression  diphenhydrAMINE 50 milliGRAM(s) Oral every 6 hours PRN eps ppx/agitation  diphenhydrAMINE   Injectable 50 milliGRAM(s) IntraMuscular once PRN severe agitation/eps ppx  haloperidol     Tablet 5 milliGRAM(s) Oral every 6 hours PRN agitation  haloperidol    Injectable 5 milliGRAM(s) IntraMuscular once PRN severe agitation  ibuprofen  Tablet. 600 milliGRAM(s) Oral every 6 hours PRN Mild Pain (1 - 3), Moderate Pain (4 - 6)  LORazepam     Tablet 2 milliGRAM(s) Oral every 6 hours PRN Agitation  LORazepam   Injectable 2 milliGRAM(s) IntraMuscular once PRN severe agitation  OLANZapine Injectable 10 milliGRAM(s) IntraMuscular once PRN MOO backup order  traZODone 50 milliGRAM(s) Oral at bedtime PRN insomnia

## 2021-07-02 NOTE — BH INPATIENT PSYCHIATRY PROGRESS NOTE - NSTXCOPEINTERMD_PSY_ALL_CORE
Psychopharm with goal of UMAÑA and supportive therapy now with RETENTION+MOO
Psychopharm with goal of UMAÑA and supportive therapy
Psychopharm with goal of UMAÑA and supportive therapy now with RETENTION+MOO
Psychopharm with goal of UMAÑA and supportive therapy
Psychopharm with goal of UMAÑA and supportive therapy now with RETENTION+MOO

## 2021-07-02 NOTE — BH INPATIENT PSYCHIATRY PROGRESS NOTE - NSTXPROBCONDUC_PSY_ALL_CORE
CONDUCT PROBLEM

## 2021-07-02 NOTE — BH INPATIENT PSYCHIATRY PROGRESS NOTE - NSBHATTENDATTEST_PSY_ALL_CORE
I have personally seen, examined and participated in the care of this patient. I have reviewed all pertinent clinical information, including history, physical exam, plan and the Medical/PA/NP Student’s note and agree except as noted.

## 2021-07-02 NOTE — BH INPATIENT PSYCHIATRY PROGRESS NOTE - NSBHMETABOLICLABS_PSY_ALL_CORE
Labs within last 12 months

## 2021-07-02 NOTE — BH INPATIENT PSYCHIATRY PROGRESS NOTE - NSBHFUPINTERVALCCFT_PSY_A_CORE
Psychosis+ruthie+noncompliance+aggression with poor insight
Psychosis+ruthie+noncompliance+aggression with poor insight  MOO application done, Court pending 6/15/21
Psychosis+ruthie+noncompliance
Psychosis+ruthie+noncompliance+aggression
Psychosis+ruthie+noncompliance+aggression with poor insight  MOO application done, Court pending 6/15/21
Psychosis+ruthie+noncompliance
Psychosis+ruthie+noncompliance+aggression with poor insight  MOO application done, Court pending 6/15/21
Psychosis+ruthie+noncompliance+aggression
Psychosis+ruthie+noncompliance
Psychosis+ruthie+noncompliance+aggression with poor insight
Psychosis+ruthie+noncompliance+aggression with poor insight  MOO application done, Court pending 6/15/21
Psychosis+ruthie+noncompliance+aggression with poor insight  MOO application done, Court pending 6/15/21
Psychosis+ruthie+noncompliance
Psychosis+ruthie+noncompliance+aggression
Psychosis+ruthie+noncompliance
Psychosis+ruthie+noncompliance+aggression with poor insight  MOO application done, Court pending 6/15/21
Psychosis+ruthie+noncompliance+aggression with poor insight
Psychosis+ruthie+noncompliance+aggression with poor insight  MOO application done, Court pending 6/15/21

## 2021-07-02 NOTE — BH INPATIENT PSYCHIATRY PROGRESS NOTE - NSTXMEDICPROGRES_PSY_ALL_CORE
No Change
Improving
No Change
Improving
No Change
No Change
Improving
No Change
Improving
No Change
No Change
Improving
No Change
Improving
No Change
Improving
Improving
No Change
No Change
Improving
No Change
Improving
No Change
Improving
Improving

## 2021-07-02 NOTE — BH INPATIENT PSYCHIATRY PROGRESS NOTE - NSTXIMPULSGOAL_PSY_ALL_CORE
Will be able to demonstrate the ability to pause before acting out negatively
Will be able to recognize 2+ triggers
Will be able to demonstrate the ability to pause before acting out negatively
Will be able to recognize 2+ triggers
Will be able to demonstrate the ability to pause before acting out negatively
Will be able to recognize 2+ triggers
Will be able to demonstrate the ability to pause before acting out negatively
Will be able to recognize 2+ triggers
Will be able to demonstrate the ability to pause before acting out negatively

## 2021-07-02 NOTE — BH INPATIENT PSYCHIATRY PROGRESS NOTE - NSTXMANICINTERMD_PSY_ALL_CORE
Psychopharm with goal of UMAÑA and supportive therapy now with RETENTION+MOO
Psychopharm with goal of UMAÑA and supportive therapy now with RETENTION+MOO
Psychopharm with goal of UMAÑA and supportive therapy
Psychopharm with goal of UMAÑA and supportive therapy now with RETENTION+MOO
Psychopharm with goal of UMAÑA and supportive therapy
Psychopharm with goal of UMAÑA and supportive therapy now with RETENTION+MOO

## 2021-07-02 NOTE — BH INPATIENT PSYCHIATRY PROGRESS NOTE - NSTXDCSOCGOAL_PSY_ALL_CORE
Will accept referrals to support groups, clubhouse, senior centers, etc.

## 2021-07-02 NOTE — BH INPATIENT PSYCHIATRY PROGRESS NOTE - NSTXCONDUCGOAL_PSY_ALL_CORE
Will develop more adaptive coping strategies to manage stress
Will describe and accept responsibility for 1 problem behavior
Will describe and accept responsibility for 1 problem behavior
Will develop more adaptive coping strategies to manage stress
Will develop more adaptive coping strategies to manage stress
Will describe and accept responsibility for 1 problem behavior
Will develop more adaptive coping strategies to manage stress
Will describe and accept responsibility for 1 problem behavior
Will develop more adaptive coping strategies to manage stress

## 2021-07-02 NOTE — BH INPATIENT PSYCHIATRY PROGRESS NOTE - NSTXPROBVIOLNT_PSY_ALL_CORE
VIOLENT/AGGRESSIVE BEHAVIOR

## 2021-07-02 NOTE — BH INPATIENT PSYCHIATRY PROGRESS NOTE - NSBHMSEAFFRANGE_PSY_A_CORE
Labile/Constricted
Labile/Constricted
Full/Constricted
Labile/Constricted
Full/Constricted
Labile/Constricted
Full/Constricted
Labile/Constricted

## 2021-07-02 NOTE — BH INPATIENT PSYCHIATRY PROGRESS NOTE - NSTXDCOPNOINTERMD_PSY_ALL_CORE
Psychopharm with goal of UMAÑA and supportive therapy now with RETENTION+MOO
Psychopharm with goal of UMAÑA and supportive therapy now with RETENTION+MOO
Psychopharm with goal of UMAÑA and supportive therapy
Psychopharm with goal of UMAÑA and supportive therapy now with RETENTION+MOO
Psychopharm with goal of UMAÑA and supportive therapy now with RETENTION+MOO
Psychopharm with goal of UMAÑA and supportive therapy
Psychopharm with goal of UMAÑA and supportive therapy now with RETENTION+MOO

## 2021-07-02 NOTE — BH INPATIENT PSYCHIATRY PROGRESS NOTE - LEVEL OF CONSCIOUSNESS
Alert

## 2021-07-02 NOTE — BH DISCHARGE NOTE NURSING/SOCIAL WORK/PSYCH REHAB - NSDCPRGOAL_PSY_ALL_CORE
Psych rehab staff met with patient for final psych rehab assessment. Patient was calm, cooperative and compliant during assessment. Patient reported he is eager and motivated for discharge. Patient denied SI/HI as well as AH/VH.  Patient has maintained compliance during the current hospitalization and has identified coping skills including playing basketball and listening to music.  Patient is assessed with fair insight and judgment.  Patient is assessed with fair ADLs and appearance.     Patient completed a Press Ganey Survey and Patient Safety Template prior to discharge.

## 2021-07-02 NOTE — BH INPATIENT PSYCHIATRY PROGRESS NOTE - NSTXPROBDCSOC_PSY_ALL_CORE
DISCHARGE ISSUE - POOR SOCIALIZATION IN COMMUNITY
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
## 2021-07-02 NOTE — BH INPATIENT PSYCHIATRY PROGRESS NOTE - NSTXVIOLNTPROGRES_PSY_ALL_CORE
Improving
No Change
Improving

## 2021-07-02 NOTE — BH INPATIENT PSYCHIATRY PROGRESS NOTE - NSTXCONDUCDATEEST_PSY_ALL_CORE
16-Jun-2021

## 2021-07-02 NOTE — BH INPATIENT PSYCHIATRY PROGRESS NOTE - NSBHPSYCHOLCOGORIENT_PSY_A_CORE
Oriented to time, place, person, situation

## 2021-07-02 NOTE — BH INPATIENT PSYCHIATRY DISCHARGE NOTE - NSBHDCRISKMITIGATE_PSY_ALL_CORE
Safety planning/Reduction in access to lethal methods (pills, firearms, etc)/Referral to case management/Long acting injectable medication/Other

## 2021-07-02 NOTE — BH DISCHARGE NOTE NURSING/SOCIAL WORK/PSYCH REHAB - NSCDUDCCRISIS_PSY_A_CORE
[facility]  1 [phone]) [facility]-WELL ([phone])  Text "WELL" to [phone]  Website: [email or web address]/.[facility] 1 [phone] 891-QSUW (6650) Website: www.safehorizon.org/.National Suicide Prevention Lifeline 3 (048) 803-1434/.  [facility]  1 (812) LIF[facility] (447-6065)/.  [facility]  [address] [place] [address] [phone]   Hours:  Monday through Friday from 9 AM to 3 PM/.  [employer] - [facility] Crisis Line  [phone], Option 1

## 2021-07-02 NOTE — BH DISCHARGE NOTE NURSING/SOCIAL WORK/PSYCH REHAB - MODE OF TRANSPORTATION
Patient requested assistance obtaining a cab, writer arranged via [facility]./Ambulatory

## 2021-08-01 ENCOUNTER — OUTPATIENT (OUTPATIENT)
Dept: OUTPATIENT SERVICES | Facility: HOSPITAL | Age: 31
LOS: 1 days | End: 2021-08-01
Payer: MEDICAID

## 2021-09-03 ENCOUNTER — OUTPATIENT (OUTPATIENT)
Dept: OUTPATIENT SERVICES | Facility: HOSPITAL | Age: 31
LOS: 1 days | Discharge: STILL ADMITTED OR ADMINISTRATIVE | End: 2021-09-03
Payer: MEDICAID

## 2021-09-03 DIAGNOSIS — F31.9 BIPOLAR DISORDER, UNSPECIFIED: ICD-10-CM

## 2021-09-06 DIAGNOSIS — Z71.89 OTHER SPECIFIED COUNSELING: ICD-10-CM

## 2021-09-07 PROCEDURE — 99214 OFFICE O/P EST MOD 30 MIN: CPT | Mod: 95

## 2021-12-01 PROCEDURE — G9005: CPT

## 2022-05-10 NOTE — BH INPATIENT PSYCHIATRY PROGRESS NOTE - NSTXMEDICDATEEST_PSY_ALL_CORE
Referred by: Josh Thomas MD; Medical Diagnosis (from order):    Diagnosis Information      Diagnosis    780.79 (ICD-9-CM) - R53.1 (ICD-10-CM) - Weakness                Progress Note    Visit:  5     SUBJECTIVE                                                                                                               PT RETURNS FOR THERAPY TODAY AFTER 2 WEEK ABSENCE DUE TO COVID EXPOSURE. PT STATES SHE HAS HAD ONGOING LBP 4/10 FOR THE PAST FEW WEEKS AS WELL.. PT REPORTS MILD NECK PAIN TODAY 4/10SHE HAD O.T.EARLIER C NERVE GLIDES WHICH MADE HER NECK A LITTLE SORE.   Current functional limitations: PROLONGED STANDING, SITTING, REACHING, LIFTING  Pain / Symptoms:  Pain rating (out of 10): Current: 4   Location: UPPER TRAPS MM    OBJECTIVE                                                                                                                     Range of Motion (ROM)   (degrees unless noted; active unless noted; norms in ( ); negative=lacking to 0, positive=beyond 0)   Cervical WFL  Lumbar:  Impairment Level:       - Flexion: minimal impairment    - Extension: moderate impairment    - Rotation:        • Left: moderate impairment         • Right: moderate impairment     - Side Bend:        • Right: moderate impairment      Muscle Flexibility:   - Hip Flexors: Left: moderate limitation Right: moderate limitation  - Hip External Rotators: Left: moderate limitation Right: moderate limitation  - Hip Internal Rotators: Left: moderate limitation Right: moderate limitation  - Knee Flexors: Left: minimal limitation Right: minimal limitation  - Knee Extensors: Left: moderate limitation Right: moderate limitation   - Plantar Flexors: Left: moderate limitation Right: moderate limitation       Outcome Measures:   OSWESTRY Total Scored: 15  OSWESTRY Total Possible Score: 45  OSWESTRY Score Calculated: 33.33 %  (0-20% = minimal disability; 20-40% = moderate disability; 40-60% = severe disability; 60-80% = crippled; 
% = bed bound) see flowsheet for additional documentation    Outcome Measures:   Quick Disabilities of the Arm, Shoulder and Hand: QuickDash Total Score (Score will not calculate if more then 2 questions are left blank): 52.27  (scored 0-100; a higher score indicates greater disability) see flowsheet for additional documentation  TREATMENT                                                                                                                  Therapeutic Exercise:  CERVICAL AROM X10  B/L UPPER TRAPS/LEVATOR STRETCH X3 X20SEC  SHOULDER ROLLS X10  SCAP RETRACT X10  AA WAND UE FLEX  X10  AA WAND EXT X10  AA WAND IR X10  RTB B UE EXT X10  RTB HABD X10  RTB SCAP ROW X10  WALL PUSH UPS X 10  SEATED MARCHES  SEATED FLEX STRETCH  SEATED LAQ  SUPINE LTR  SUPINE SKTC 2 X 20 SECS    Manual Therapy:  STM/MFR B UPPER /LOWER TRAPS /LEVATOR/RHOMBOID    Skilled input: verbal instruction/cues    Writer verbally educated and received verbal consent for hand placement, positioning of patient, and techniques to be performed today from patient for clothing adjustments for techniques, therapist position for techniques and hand placement and palpation for techniques as described above and how they are pertinent to the patient's plan of care.    Home Exercise Program/Education Materials: Access Code: [email or web address]  URL: [email or web address]  Date: 04/05/2022  Prepared by: Ofelia Panda    Exercises  · Seated Cervical Rotation AROM - 1 x daily - 7 x weekly - 1 sets - 10 reps  · Standing Backward Shoulder Rolls - 1 x daily - 7 x weekly - 1 sets - 10 reps  · Standing Scapular Retraction - 1 x daily - 7 x weekly - 1 sets - 10 reps  · Standing Shoulder Extension with Resistance - 1 x daily - 7 x weekly - 1 sets - 10 reps  · Standing Bilateral Low Shoulder Row with Anchored Resistance - 1 x daily - 7 x weekly - 1 sets - 10 reps  · Shoulder External Rotation and Scapular Retraction with Resistance - 1 x daily - 7 x 
25-May-2021
weekly - 1 sets - 10 reps  · Standing Shoulder Horizontal Abduction with Resistance - 1 x daily - 7 x weekly - 1 sets - 10 reps  · Seated Gentle Upper Trapezius Stretch - 1 x daily - 7 x weekly - 1 sets - 3 reps - 10-20 hold  · Gentle Levator Scapulae Stretch - 1 x daily - 7 x weekly - 1 sets - 3 reps - 10-20 hold  · Seated Cervical Flexion AROM - 1 x daily - 7 x weekly - 1 sets - 10 reps             ASSESSMENT                                                                                                             PT CONTINUES C IMPAIRED STRENGTH L UE AND B LE'S C CHRONIC NECK AND BACK PAIN. OVERALL SHE MOVES MUCH BETTER AND DEMONSTRATES LESS ST RESTRICTION AND TIGHTNESS. PT REPORTS SHE FEELS BETTER AFTER P.T. PROGRESSING TOWARDS ALL GOALS. RECOMMEND CONTINUE SKILLED P.T. INTERVENTION X 3 MORE SESSIONS, THEN ASSESS RE: DISCHARGE.  Pain/symptoms after session (out of 10): 3  To date the patient has made gains as expected as reported. Patient continues to have impairments and functional deficits as noted.  Patient will continue to benefit from skilled care as outlined.  Patient Education:   Results of above outlined education: Verbalizes understanding and Demonstrates understanding      PLAN                                                                                                                           Updates to plan of care: extend current plan of care    Frequency / Duration: 1 times per week tapering as patient progresses  for an estimated additional 3 visits for additional 3 weeks        GOALS                                                                                                                           Long Term Goals: to be met by end of plan of care  1. PT WILL BE INSTRUCTED IN POSTURE REEDUCATION, BODY MECHANICS, HEP FOR NECK AND SHOULDER Status: met   2. PT WILL SIT >1 HOUR S SHOULDER OR NECK PAIN Status: progressing/ongoing  3. PT WILL LIFT ARMS OH FOR SELF CARE TASKS INCLUDING 
DRESSING AND GROOMING S NECK OR SHOULDER PAIN Status: progressing/ongoing  4. Quick DASH: Patient will complete form to reflect an improved calculated score to less than or equal to </=40 to indicate patient reported improvement in function/disability/impairment. (minimal clinically important difference = 15.91)      Therapy procedure time and total treatment time can be found documented on the Time Entry flowsheet  
25-May-2021
24-May-2021
25-May-2021
24-May-2021
25-May-2021
24-May-2021
25-May-2021
24-May-2021
16-Jun-2021
25-May-2021
16-Jun-2021
24-May-2021
25-May-2021
16-Jun-2021
24-May-2021

## 2022-11-24 NOTE — BH INPATIENT PSYCHIATRY DISCHARGE NOTE - NSICDXPASTSURGICALHX_GEN_ALL_CORE_FT
Problem: Discharge Planning  Goal: Discharge to home or other facility with appropriate resources  11/24/2022 1029 by Veronique Avila RN  Outcome: Progressing  Flowsheets (Taken 11/24/2022 0801)  Discharge to home or other facility with appropriate resources: Identify barriers to discharge with patient and caregiver     Problem: Pain  Goal: Verbalizes/displays adequate comfort level or baseline comfort level  11/24/2022 1029 by Veronique Avila RN  Outcome: Progressing  Flowsheets (Taken 11/24/2022 0801)  Verbalizes/displays adequate comfort level or baseline comfort level: Encourage patient to monitor pain and request assistance   Pain /discomfort being managed with PRN analgesics per MD orders. Patient able to express presence and absence of pain and rate pain appropriately using numerical scale.      Problem: Safety - Adult  Goal: Free from fall injury  11/24/2022 1029 by Veronique Avila RN  Outcome: Progressing  Flowsheets (Taken 11/24/2022 0801)  Free From Fall Injury: Instruct family/caregiver on patient safety     Problem: ABCDS Injury Assessment  Goal: Absence of physical injury  11/24/2022 1029 by Veronique Avila RN  Outcome: Progressing  Flowsheets (Taken 11/24/2022 0801)  Absence of Physical Injury: Implement safety measures based on patient assessment     Problem: Respiratory - Adult  Goal: Achieves optimal ventilation and oxygenation  11/24/2022 1029 by Veronique Avila RN  Outcome: Progressing  Flowsheets (Taken 11/24/2022 0801)  Achieves optimal ventilation and oxygenation: Assess for changes in respiratory status     Problem: Cardiovascular - Adult  Goal: Maintains optimal cardiac output and hemodynamic stability  11/24/2022 1029 by Veronique Avila RN  Outcome: Progressing  Flowsheets (Taken 11/24/2022 0801)  Maintains optimal cardiac output and hemodynamic stability: Monitor blood pressure and heart rate     Problem: Skin/Tissue Integrity - Adult  Goal: Skin integrity remains intact  11/24/2022 1029 by Ludivina Hernandez RN  Outcome: Progressing  Flowsheets (Taken 11/24/2022 0801)  Skin Integrity Remains Intact: Monitor for areas of redness and/or skin breakdown     Problem: Musculoskeletal - Adult  Goal: Return mobility to safest level of function  11/24/2022 1029 by Ludivina Hernandez RN  Outcome: Progressing  Flowsheets (Taken 11/24/2022 0801)  Return Mobility to Safest Level of Function: Assess patient stability and activity tolerance for standing, transferring and ambulating with or without assistive devices     Problem: Genitourinary - Adult  Goal: Absence of urinary retention  11/24/2022 1029 by Ludivina Hernandez RN  Outcome: Progressing  Flowsheets (Taken 11/24/2022 0801)  Absence of urinary retention: Assess patients ability to void and empty bladder     Problem: Infection - Adult  Goal: Absence of infection at discharge  11/24/2022 1029 by Ludivina Hernandez RN  Outcome: Progressing  Flowsheets (Taken 11/24/2022 0801)  Absence of infection at discharge: Assess and monitor for signs and symptoms of infection     Problem: Metabolic/Fluid and Electrolytes - Adult  Goal: Electrolytes maintained within normal limits  11/24/2022 1029 by Ludivina Hernandez RN  Outcome: Progressing  Flowsheets (Taken 11/24/2022 0801)  Electrolytes maintained within normal limits: Monitor labs and assess patient for signs and symptoms of electrolyte imbalances     Problem: Skin/Tissue Integrity  Goal: Absence of new skin breakdown  Description: 1. Monitor for areas of redness and/or skin breakdown  2. Assess vascular access sites hourly  3. Every 4-6 hours minimum:  Change oxygen saturation probe site  4. Every 4-6 hours:  If on nasal continuous positive airway pressure, respiratory therapy assess nares and determine need for appliance change or resting period.   11/24/2022 1029 by Ludivina Hernandez RN  Outcome: Progressing     Problem: Neurosensory - Adult  Goal: Achieves stable or improved neurological status  11/24/2022 1029 by Claudio Godoy RN  Outcome: Progressing  Flowsheets (Taken 11/24/2022 0801)  Achieves stable or improved neurological status: Assess for and report changes in neurological status     Problem: Gastrointestinal - Adult  Goal: Minimal or absence of nausea and vomiting  11/24/2022 1029 by Claudio Godoy RN  Outcome: Progressing  Flowsheets (Taken 11/24/2022 0801)  Minimal or absence of nausea and vomiting: Administer IV fluids as ordered to ensure adequate hydration No significant past surgical history

## 2023-01-12 NOTE — BH INPATIENT PSYCHIATRY PROGRESS NOTE - NSBHFUPMEDSE_PSY_A_CORE
None known
203.9
None known
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
## 2023-09-25 NOTE — BH TREATMENT PLAN - NSTXCONDUCINTERMD_PSY_ALL_CORE
Christine Chance CMA 9/25/2023 9:38 AM CDT      ----- Message -----  From: Leah Altman  Sent: 9/25/2023 9:36 AM CDT  To: *  Subject: Changing an appointment     Hi Dr. Walls, I had mentioned to you about changing Chon’s 3-month appointment to sometime in November, but when I tried to change it, they said the next available appointment is in January. We are scheduled for the neurologist on October 25th. I thought you might want to see him after that and get our flu shots, but January seems too late for that. Can I still cancel 10/6?   I did not realize you were booked that far out.     
Psychopharm with goal of UMA[facility]A and supportive therapy now with [facility]
Psychopharm with goal of UMAÑA and supportive therapy
Psychopharm with goal of UMAÑA and supportive therapy now with [facility]

## 2023-10-24 NOTE — BH PATIENT PROFILE - ARE SIGNIFICANT INDICATORS COMPLETE.
Your stress test and heart scans are normal.    No changes in your medications.    Stay on the aspirin 81 mg daily   Yes

## 2023-11-14 NOTE — ED PROVIDER NOTE - PHYSICAL EXAMINATION
GEN - mildly agitated, well appearing; A+O x3   HEAD - NC/AT     EYES - EOMI, no conjunctival pallor, no scleral icterus  ENT -   mucous membranes  moist , no discharge      NECK - Neck supple  PULM - CTA b/l,  symmetric breath sounds  COR -  RRR, S1 S2, no murmurs  ABD - , ND, NT, soft, no guarding, no rebound, no masses    BACK - no CVA tenderness, nontender spine     EXTREMS - no edema, no deformity, warm and well perfused    SKIN - no rash or bruising      NEUROLOGIC - alert, sensation nl, motor 5/5 RUE/LUE/RLE/LLE  PSYCH - appears upset, hard to redirect, fast speech. Denies SI/HI, hallucinations. Anesthesia Volume In Cc (Will Not Render If 0): 1

## 2024-04-04 NOTE — BH INPATIENT PSYCHIATRY PROGRESS NOTE - NSBHMSEGROOM_PSY_A_CORE
Poor
Oriented to time, place, person, situation
Poor
Fair
Poor
Poor
Fair
Poor
Fair
Poor

## 2024-07-03 NOTE — BH TREATMENT PLAN - NSTXPROBIMPULS_PSY_ALL_CORE
IMPULSIVITY/AGITATION
suicidal attempt
IMPULSIVITY/AGITATION

## 2024-07-23 NOTE — ED BEHAVIORAL HEALTH ASSESSMENT NOTE - FAMILY HISTORY OF PSYCHIATRIC ILLNESS / SUICIDALITY
[facility] CARDIOLOGY  Response for E-Consult     Patient Name: Yusef Gray Jr.  MRN: [id number]  Primary Care Provider: Reji Leon MD   Requesting Provider: Gianna Mcbride NP  E-Consult to Cardiology  Consult performed by: Tuan Simms MD  Consult ordered by: Gianna Mcbride Tuan Simms MD  [facility] CARDIOLOGY      
None known

## 2024-12-23 NOTE — BH INPATIENT PSYCHIATRY PROGRESS NOTE - MSE OPTIONS
Structured MSE
The patient is a 5y3m Female complaining of vomiting.
Structured MSE
